# Patient Record
Sex: MALE | Race: WHITE | Employment: OTHER | URBAN - METROPOLITAN AREA
[De-identification: names, ages, dates, MRNs, and addresses within clinical notes are randomized per-mention and may not be internally consistent; named-entity substitution may affect disease eponyms.]

---

## 2019-02-07 ENCOUNTER — HOSPITAL ENCOUNTER (OUTPATIENT)
Age: 59
Setting detail: SPECIMEN
Discharge: HOME | End: 2019-02-07
Payer: MEDICAID

## 2019-02-08 LAB
ABSOLUTE EOS #: 0.26 K/UL (ref 0–0.44)
ABSOLUTE IMMATURE GRANULOCYTE: 0.04 K/UL (ref 0–0.3)
ABSOLUTE LYMPH #: 1.89 K/UL (ref 1.1–3.7)
ABSOLUTE MONO #: 1.01 K/UL (ref 0.1–1.2)
ALBUMIN SERPL-MCNC: 4.1 G/DL (ref 3.5–5.2)
ALBUMIN/GLOBULIN RATIO: 1.3 (ref 1–2.5)
ALP BLD-CCNC: 100 U/L (ref 40–129)
ALT SERPL-CCNC: 27 U/L (ref 5–41)
ANION GAP SERPL CALCULATED.3IONS-SCNC: 12 MMOL/L (ref 9–17)
AST SERPL-CCNC: 20 U/L
BASOPHILS # BLD: 1 % (ref 0–2)
BASOPHILS ABSOLUTE: 0.04 K/UL (ref 0–0.2)
BILIRUB SERPL-MCNC: <0.1 MG/DL (ref 0.3–1.2)
BUN BLDV-MCNC: 9 MG/DL (ref 6–20)
BUN/CREAT BLD: ABNORMAL (ref 9–20)
CALCIUM SERPL-MCNC: 8.9 MG/DL (ref 8.6–10.4)
CHLORIDE BLD-SCNC: 105 MMOL/L (ref 98–107)
CO2: 24 MMOL/L (ref 20–31)
CREAT SERPL-MCNC: 0.87 MG/DL (ref 0.7–1.2)
DIFFERENTIAL TYPE: ABNORMAL
EOSINOPHILS RELATIVE PERCENT: 3 % (ref 1–4)
GFR AFRICAN AMERICAN: >60 ML/MIN
GFR NON-AFRICAN AMERICAN: >60 ML/MIN
GFR SERPL CREATININE-BSD FRML MDRD: ABNORMAL ML/MIN/{1.73_M2}
GFR SERPL CREATININE-BSD FRML MDRD: ABNORMAL ML/MIN/{1.73_M2}
GLUCOSE BLD-MCNC: 95 MG/DL (ref 70–99)
HCT VFR BLD CALC: 46.9 % (ref 40.7–50.3)
HEMOGLOBIN: 15 G/DL (ref 13–17)
IMMATURE GRANULOCYTES: 1 %
LYMPHOCYTES # BLD: 25 % (ref 24–43)
MCH RBC QN AUTO: 30.7 PG (ref 25.2–33.5)
MCHC RBC AUTO-ENTMCNC: 32 G/DL (ref 28.4–34.8)
MCV RBC AUTO: 95.9 FL (ref 82.6–102.9)
MONOCYTES # BLD: 13 % (ref 3–12)
NRBC AUTOMATED: 0 PER 100 WBC
PDW BLD-RTO: 15.1 % (ref 11.8–14.4)
PLATELET # BLD: 270 K/UL (ref 138–453)
PLATELET ESTIMATE: ABNORMAL
PMV BLD AUTO: 10 FL (ref 8.1–13.5)
POTASSIUM SERPL-SCNC: 4.4 MMOL/L (ref 3.7–5.3)
RBC # BLD: 4.89 M/UL (ref 4.21–5.77)
RBC # BLD: ABNORMAL 10*6/UL
SEG NEUTROPHILS: 57 % (ref 36–65)
SEGMENTED NEUTROPHILS ABSOLUTE COUNT: 4.3 K/UL (ref 1.5–8.1)
SODIUM BLD-SCNC: 141 MMOL/L (ref 135–144)
TOTAL PROTEIN: 7.3 G/DL (ref 6.4–8.3)
WBC # BLD: 7.5 K/UL (ref 3.5–11.3)
WBC # BLD: ABNORMAL 10*3/UL

## 2019-08-12 ENCOUNTER — HOSPITAL ENCOUNTER (OUTPATIENT)
Age: 59
Setting detail: SPECIMEN
Discharge: HOME | End: 2019-08-12
Payer: MEDICAID

## 2019-08-12 LAB
HCT VFR BLD CALC: 47.1 % (ref 40.7–50.3)
HEMOGLOBIN: 14.9 G/DL (ref 13–17)
MCH RBC QN AUTO: 31.1 PG (ref 25.2–33.5)
MCHC RBC AUTO-ENTMCNC: 31.6 G/DL (ref 28.4–34.8)
MCV RBC AUTO: 98.3 FL (ref 82.6–102.9)
NRBC AUTOMATED: 0 PER 100 WBC
PDW BLD-RTO: 14.6 % (ref 11.8–14.4)
PLATELET # BLD: 284 K/UL (ref 138–453)
PMV BLD AUTO: 9.6 FL (ref 8.1–13.5)
RBC # BLD: 4.79 M/UL (ref 4.21–5.77)
WBC # BLD: 5.8 K/UL (ref 3.5–11.3)

## 2019-08-13 LAB
ALBUMIN SERPL-MCNC: 3.8 G/DL (ref 3.5–5.2)
ALBUMIN/GLOBULIN RATIO: 1.2 (ref 1–2.5)
ALP BLD-CCNC: 90 U/L (ref 40–129)
ALT SERPL-CCNC: 29 U/L (ref 5–41)
ANION GAP SERPL CALCULATED.3IONS-SCNC: 15 MMOL/L (ref 9–17)
AST SERPL-CCNC: 22 U/L
BILIRUB SERPL-MCNC: 0.19 MG/DL (ref 0.3–1.2)
BUN BLDV-MCNC: 8 MG/DL (ref 6–20)
BUN/CREAT BLD: ABNORMAL (ref 9–20)
CALCIUM SERPL-MCNC: 8.8 MG/DL (ref 8.6–10.4)
CHLORIDE BLD-SCNC: 107 MMOL/L (ref 98–107)
CHOLESTEROL, FASTING: 143 MG/DL
CHOLESTEROL/HDL RATIO: 4.1
CO2: 22 MMOL/L (ref 20–31)
CREAT SERPL-MCNC: 0.93 MG/DL (ref 0.7–1.2)
FERRITIN: 69 UG/L (ref 30–400)
GFR AFRICAN AMERICAN: >60 ML/MIN
GFR NON-AFRICAN AMERICAN: >60 ML/MIN
GFR SERPL CREATININE-BSD FRML MDRD: ABNORMAL ML/MIN/{1.73_M2}
GFR SERPL CREATININE-BSD FRML MDRD: ABNORMAL ML/MIN/{1.73_M2}
GLUCOSE BLD-MCNC: 118 MG/DL (ref 70–99)
HDLC SERPL-MCNC: 35 MG/DL
IRON SATURATION: 19 % (ref 20–55)
IRON: 48 UG/DL (ref 59–158)
LDL CHOLESTEROL: 68 MG/DL (ref 0–130)
POTASSIUM SERPL-SCNC: 4.5 MMOL/L (ref 3.7–5.3)
SODIUM BLD-SCNC: 144 MMOL/L (ref 135–144)
TESTOSTERONE TOTAL: 312 NG/DL (ref 220–1000)
TOTAL IRON BINDING CAPACITY: 257 UG/DL (ref 250–450)
TOTAL PROTEIN: 7.1 G/DL (ref 6.4–8.3)
TRIGLYCERIDE, FASTING: 200 MG/DL
TSH SERPL DL<=0.05 MIU/L-ACNC: 1.03 MIU/L (ref 0.3–5)
UNSATURATED IRON BINDING CAPACITY: 209 UG/DL (ref 112–347)
VITAMIN D 25-HYDROXY: 31.3 NG/ML (ref 30–100)
VLDLC SERPL CALC-MCNC: ABNORMAL MG/DL (ref 1–30)

## 2019-11-11 ENCOUNTER — HOSPITAL ENCOUNTER (OUTPATIENT)
Age: 59
Setting detail: SPECIMEN
Discharge: HOME | End: 2019-11-11
Payer: MEDICAID

## 2019-11-12 LAB
ABSOLUTE EOS #: 0.17 K/UL (ref 0–0.44)
ABSOLUTE IMMATURE GRANULOCYTE: 0.04 K/UL (ref 0–0.3)
ABSOLUTE LYMPH #: 1.9 K/UL (ref 1.1–3.7)
ABSOLUTE MONO #: 1.13 K/UL (ref 0.1–1.2)
ANION GAP SERPL CALCULATED.3IONS-SCNC: 13 MMOL/L (ref 9–17)
BASOPHILS # BLD: 1 % (ref 0–2)
BASOPHILS ABSOLUTE: 0.05 K/UL (ref 0–0.2)
BUN BLDV-MCNC: 13 MG/DL (ref 6–20)
BUN/CREAT BLD: NORMAL (ref 9–20)
CALCIUM SERPL-MCNC: 8.8 MG/DL (ref 8.6–10.4)
CHLORIDE BLD-SCNC: 101 MMOL/L (ref 98–107)
CHOLESTEROL/HDL RATIO: 3.9
CHOLESTEROL: 136 MG/DL
CO2: 21 MMOL/L (ref 20–31)
CREAT SERPL-MCNC: 0.99 MG/DL (ref 0.7–1.2)
DIFFERENTIAL TYPE: ABNORMAL
EOSINOPHILS RELATIVE PERCENT: 2 % (ref 1–4)
GFR AFRICAN AMERICAN: >60 ML/MIN
GFR NON-AFRICAN AMERICAN: >60 ML/MIN
GFR SERPL CREATININE-BSD FRML MDRD: NORMAL ML/MIN/{1.73_M2}
GFR SERPL CREATININE-BSD FRML MDRD: NORMAL ML/MIN/{1.73_M2}
GLUCOSE BLD-MCNC: 95 MG/DL (ref 70–99)
HCT VFR BLD CALC: 45.9 % (ref 40.7–50.3)
HDLC SERPL-MCNC: 35 MG/DL
HEMOGLOBIN: 14.7 G/DL (ref 13–17)
IMMATURE GRANULOCYTES: 0 %
LDL CHOLESTEROL: 70 MG/DL (ref 0–130)
LYMPHOCYTES # BLD: 19 % (ref 24–43)
MCH RBC QN AUTO: 31.2 PG (ref 25.2–33.5)
MCHC RBC AUTO-ENTMCNC: 32 G/DL (ref 28.4–34.8)
MCV RBC AUTO: 97.5 FL (ref 82.6–102.9)
MONOCYTES # BLD: 11 % (ref 3–12)
NRBC AUTOMATED: 0 PER 100 WBC
PDW BLD-RTO: 15.4 % (ref 11.8–14.4)
PLATELET # BLD: 251 K/UL (ref 138–453)
PLATELET ESTIMATE: ABNORMAL
PMV BLD AUTO: 9.6 FL (ref 8.1–13.5)
POTASSIUM SERPL-SCNC: 4.1 MMOL/L (ref 3.7–5.3)
RBC # BLD: 4.71 M/UL (ref 4.21–5.77)
RBC # BLD: ABNORMAL 10*6/UL
SEG NEUTROPHILS: 67 % (ref 36–65)
SEGMENTED NEUTROPHILS ABSOLUTE COUNT: 6.62 K/UL (ref 1.5–8.1)
SODIUM BLD-SCNC: 135 MMOL/L (ref 135–144)
TOTAL CK: 88 U/L (ref 39–308)
TRIGL SERPL-MCNC: 154 MG/DL
VLDLC SERPL CALC-MCNC: ABNORMAL MG/DL (ref 1–30)
WBC # BLD: 9.9 K/UL (ref 3.5–11.3)
WBC # BLD: ABNORMAL 10*3/UL

## 2020-09-15 ENCOUNTER — HOSPITAL ENCOUNTER (OUTPATIENT)
Age: 60
Setting detail: SPECIMEN
Discharge: HOME | End: 2020-09-15
Payer: MEDICAID

## 2020-09-15 LAB
ABSOLUTE EOS #: 0.23 K/UL (ref 0–0.44)
ABSOLUTE IMMATURE GRANULOCYTE: 0.04 K/UL (ref 0–0.3)
ABSOLUTE LYMPH #: 1.99 K/UL (ref 1.1–3.7)
ABSOLUTE MONO #: 0.87 K/UL (ref 0.1–1.2)
ALBUMIN SERPL-MCNC: 4 G/DL (ref 3.5–5.2)
ALBUMIN/GLOBULIN RATIO: 1.3 (ref 1–2.5)
ALP BLD-CCNC: 94 U/L (ref 40–129)
ALT SERPL-CCNC: 31 U/L (ref 5–41)
ANION GAP SERPL CALCULATED.3IONS-SCNC: 10 MMOL/L (ref 9–17)
AST SERPL-CCNC: 25 U/L
BASOPHILS # BLD: 1 % (ref 0–2)
BASOPHILS ABSOLUTE: 0.04 K/UL (ref 0–0.2)
BILIRUB SERPL-MCNC: 0.27 MG/DL (ref 0.3–1.2)
BNP INTERPRETATION: NORMAL
BUN BLDV-MCNC: 9 MG/DL (ref 6–20)
BUN/CREAT BLD: ABNORMAL (ref 9–20)
CALCIUM SERPL-MCNC: 9.6 MG/DL (ref 8.6–10.4)
CHLORIDE BLD-SCNC: 106 MMOL/L (ref 98–107)
CHOLESTEROL, FASTING: 138 MG/DL
CHOLESTEROL/HDL RATIO: 3.7
CO2: 26 MMOL/L (ref 20–31)
CREAT SERPL-MCNC: 1.01 MG/DL (ref 0.7–1.2)
DIFFERENTIAL TYPE: ABNORMAL
EOSINOPHILS RELATIVE PERCENT: 3 % (ref 1–4)
FERRITIN: 230 UG/L (ref 30–400)
GFR AFRICAN AMERICAN: >60 ML/MIN
GFR NON-AFRICAN AMERICAN: >60 ML/MIN
GFR SERPL CREATININE-BSD FRML MDRD: ABNORMAL ML/MIN/{1.73_M2}
GFR SERPL CREATININE-BSD FRML MDRD: ABNORMAL ML/MIN/{1.73_M2}
GLUCOSE BLD-MCNC: 89 MG/DL (ref 70–99)
HCT VFR BLD CALC: 47.9 % (ref 40.7–50.3)
HDLC SERPL-MCNC: 37 MG/DL
HEMOGLOBIN: 15.2 G/DL (ref 13–17)
IMMATURE GRANULOCYTES: 1 %
IRON SATURATION: 33 % (ref 20–55)
IRON: 76 UG/DL (ref 59–158)
LDL CHOLESTEROL: 76 MG/DL (ref 0–130)
LYMPHOCYTES # BLD: 30 % (ref 24–43)
MCH RBC QN AUTO: 30.9 PG (ref 25.2–33.5)
MCHC RBC AUTO-ENTMCNC: 31.7 G/DL (ref 28.4–34.8)
MCV RBC AUTO: 97.4 FL (ref 82.6–102.9)
MONOCYTES # BLD: 13 % (ref 3–12)
NRBC AUTOMATED: 0 PER 100 WBC
PDW BLD-RTO: 14.9 % (ref 11.8–14.4)
PLATELET # BLD: 258 K/UL (ref 138–453)
PLATELET ESTIMATE: ABNORMAL
PMV BLD AUTO: 9.8 FL (ref 8.1–13.5)
POTASSIUM SERPL-SCNC: 4.9 MMOL/L (ref 3.7–5.3)
PRO-BNP: 47 PG/ML
PROSTATE SPECIFIC ANTIGEN: 0.42 UG/L
RBC # BLD: 4.92 M/UL (ref 4.21–5.77)
RBC # BLD: ABNORMAL 10*6/UL
SEG NEUTROPHILS: 52 % (ref 36–65)
SEGMENTED NEUTROPHILS ABSOLUTE COUNT: 3.51 K/UL (ref 1.5–8.1)
SODIUM BLD-SCNC: 142 MMOL/L (ref 135–144)
TOTAL IRON BINDING CAPACITY: 232 UG/DL (ref 250–450)
TOTAL PROTEIN: 7 G/DL (ref 6.4–8.3)
TRIGLYCERIDE, FASTING: 123 MG/DL
TSH SERPL DL<=0.05 MIU/L-ACNC: 1.96 MIU/L (ref 0.3–5)
UNSATURATED IRON BINDING CAPACITY: 156 UG/DL (ref 112–347)
VLDLC SERPL CALC-MCNC: ABNORMAL MG/DL (ref 1–30)
WBC # BLD: 6.7 K/UL (ref 3.5–11.3)
WBC # BLD: ABNORMAL 10*3/UL

## 2020-09-16 LAB
ESTIMATED AVERAGE GLUCOSE: 131 MG/DL
HBA1C MFR BLD: 6.2 % (ref 4–6)

## 2021-02-04 NOTE — ED
Fall HPI





- General


Chief Complaint: Wound/Laceration


Stated Complaint: Fall


Time Seen by Provider: 02/04/21 04:11


Source: patient, EMS, RN notes reviewed, old records reviewed


Mode of arrival: EMS


Limitations: no limitations





- History of Present Illness


Initial Comments: 





This is a 60-year-old male DF for evaluation patient presents today for 

evaluation regards to fall.  Unknown circumstances around fall patient is a poor

story history from EMS patient's chart patient does have laceration to right 

ear.  No known blood thinners, unsure of loss of consciousness


MD Complaint: fall


-: unknown


Fall From: standing


When Fall Occurred: unsure


Fall Witnessed: no


Place Fall Occurred: nursing home/SNF


Loss of Consciousness: unsure


Prolonged Down Time?: no


Symptoms Prior to Fall: none


Location: head


Severity: moderate


Severity scale (1-10): 5


Quality: sharp


Context: tripped/slipped


Associated Symptoms: denies





Review of Systems


ROS Statement: 


Those systems with pertinent positive or pertinent negative responses have been 

documented in the HPI.





ROS Other: All systems not noted in ROS Statement are negative.





Past Medical History


Past Medical History: Unable to Obtain


History of Any Multi-Drug Resistant Organisms: None Reported


Past Surgical History: Unable to Obtain


Past Psychological History: Unable to Obtain


Smoking Status: Current every day smoker


Past Alcohol Use History: Occasional


Past Drug Use History: None Reported





General Exam


Limitations: altered mental status


General appearance: alert, in no apparent distress


Head exam: Present: normocephalic, normal inspection.  Absent: atraumatic 

(Patient did sustain laceration to right ear almost near avulsion, 4 cm)


Eye exam: Present: normal appearance, PERRL, EOMI.  Absent: scleral icterus, 

conjunctival injection, periorbital swelling


ENT exam: Present: normal exam, mucous membranes moist


Neck exam: Present: normal inspection.  Absent: tenderness, meningismus, 

lymphadenopathy


Respiratory exam: Present: normal lung sounds bilaterally.  Absent: respiratory 

distress, wheezes, rales, rhonchi, stridor


Cardiovascular Exam: Present: regular rate, normal rhythm, normal heart sounds. 

Absent: systolic murmur, diastolic murmur, rubs, gallop, clicks


GI/Abdominal exam: Present: soft, normal bowel sounds.  Absent: distended, 

tenderness, guarding, rebound, rigid


Extremities exam: Present: normal inspection, full ROM, normal capillary refill.

 Absent: tenderness, pedal edema, joint swelling, calf tenderness


Back exam: Present: normal inspection


Neurological exam: Present: alert, oriented X3, CN II-XII intact


Psychiatric exam: Present: normal affect, normal mood


Skin exam: Present: warm, dry, intact, normal color.  Absent: rash





Course


                                   Vital Signs











  02/04/21 02/04/21





  04:02 05:25


 


Temperature 98.3 F 


 


Pulse Rate 76 72


 


Respiratory 18 18





Rate  


 


Blood Pressure 144/90 130/72


 


O2 Sat by Pulse 98 98





Oximetry  














- Reevaluation(s)


Reevaluation #1: 





Medical record is reviewed





Laceration is repaired here in the ER bleeding is stopped although tissue of the

ear does look to r to be devitalized





Procedures





- Laceration


  ** Laceration #1


Consent Obtained: verbal consent


Indication: laceration


Site: scalp, other (ear)


Description: linear, flap


Depth: simple, single layer


Anesthetic Used: lidocaine 1%


Type of Sutures: nylon


Size of Sutures: 5-0


Technique: simple, interrupted


Complications: pain


Patient Tolerated Procedure: well





Medical Decision Making





- Medical Decision Making





60 male to the ER status post fall of unknown, CT is negative for traumatic 

injury patient's laceration is repaired





- Radiology Data


Radiology results: report reviewed (CT brain C-spine negative for acute 

disease), image reviewed





Disposition


Clinical Impression: 


 Fall, Laceration of right ear





Disposition: HOME SELF-CARE


Condition: Good


Instructions (If sedation given, give patient instructions):  Laceration (ED), 

Fall Prevention for Older Adults (ED)


Is patient prescribed a controlled substance at d/c from ED?: No


Referrals: 


None,Stated [Primary Care Provider] - 1-2 days

## 2021-02-04 NOTE — CT
EXAM:

  CT Head Without Intravenous Contrast

 

CLINICAL HISTORY:

  ITS.REASON CT Reason: fall

 

TECHNIQUE:

  Axial computed tomography images of the head/brain without intravenous 

contrast.  CTDI is 57.035 mGy and DLP is 1304.2 mGy-cm.  This CT exam was 

performed using one or more of the following dose reduction techniques: 

automated exposure control, adjustment of the mA and/or kV according to 

patient size, and/or use of iterative reconstruction technique.

 

COMPARISON:

  No relevant prior studies available.

 

FINDINGS:

  Brain:  No hemorrhage or mass effect.  Mild cerebral volume loss.

  Ventricles:  No hydrocephalus.

  Bones/joints:  Unremarkable.

  Soft tissues:  Unremarkable.

  Sinuses:  Unremarkable.

  Mastoid air cells:  Clear.

 

IMPRESSION:     

  No acute hemorrhage, hydrocephalus, or mass effect.

 

_______________________________________________

 

EXAM:

  CT Cervical Spine Without Intravenous Contrast

 

CLINICAL HISTORY:

  ITS.REASON CT Reason: fall

 

TECHNIQUE:

  Axial computed tomography images of the cervical spine without 

intravenous contrast.  CTDI is 57.035 mGy and DLP is 1304.2 mGy-cm.  This 

CT exam was performed using one or more of the following dose reduction 

techniques: automated exposure control, adjustment of the mA and/or kV 

according to patient size, and/or use of iterative reconstruction 

technique.

 

COMPARISON:

  No relevant prior studies available.

 

FINDINGS:

  Vertebrae:  No acute fracture.  Prior surgical fixation of the right 

clavicle.

  Discs/spinal canal/neural foramina: Multilevel degenerative disease 

with moderate spinal canal stenosis at C5-6 and C6-7.

  Soft tissues:  No prevertebral swelling.  Mild COPD.

 

IMPRESSION:     

  No acute fracture or subluxation. Multilevel degenerative disease with 

moderate spinal canal stenosis at C5-6 and C6-7.

## 2021-06-21 ENCOUNTER — HOSPITAL ENCOUNTER (OUTPATIENT)
Age: 61
Setting detail: SPECIMEN
Discharge: HOME | End: 2021-06-21
Payer: MEDICAID

## 2021-06-21 LAB
ABSOLUTE EOS #: 0.24 K/UL (ref 0–0.44)
ABSOLUTE IMMATURE GRANULOCYTE: 0.03 K/UL (ref 0–0.3)
ABSOLUTE LYMPH #: 2.35 K/UL (ref 1.1–3.7)
ABSOLUTE MONO #: 1.06 K/UL (ref 0.1–1.2)
ALBUMIN SERPL-MCNC: 4.1 G/DL (ref 3.5–5.2)
ALBUMIN/GLOBULIN RATIO: 1.2 (ref 1–2.5)
ALP BLD-CCNC: 86 U/L (ref 40–129)
ALT SERPL-CCNC: 25 U/L (ref 5–41)
ANION GAP SERPL CALCULATED.3IONS-SCNC: 12 MMOL/L (ref 9–17)
AST SERPL-CCNC: 21 U/L
BASOPHILS # BLD: 1 % (ref 0–2)
BASOPHILS ABSOLUTE: 0.05 K/UL (ref 0–0.2)
BILIRUB SERPL-MCNC: 0.39 MG/DL (ref 0.3–1.2)
BUN BLDV-MCNC: 9 MG/DL (ref 6–20)
BUN/CREAT BLD: NORMAL (ref 9–20)
CALCIUM SERPL-MCNC: 9.4 MG/DL (ref 8.6–10.4)
CHLORIDE BLD-SCNC: 105 MMOL/L (ref 98–107)
CHOLESTEROL, FASTING: 133 MG/DL
CHOLESTEROL/HDL RATIO: 3.6
CO2: 23 MMOL/L (ref 20–31)
CREAT SERPL-MCNC: 1.12 MG/DL (ref 0.7–1.2)
DIFFERENTIAL TYPE: ABNORMAL
EOSINOPHILS RELATIVE PERCENT: 4 % (ref 1–4)
GFR AFRICAN AMERICAN: >60 ML/MIN
GFR NON-AFRICAN AMERICAN: >60 ML/MIN
GFR SERPL CREATININE-BSD FRML MDRD: NORMAL ML/MIN/{1.73_M2}
GFR SERPL CREATININE-BSD FRML MDRD: NORMAL ML/MIN/{1.73_M2}
GLUCOSE BLD-MCNC: 79 MG/DL (ref 70–99)
HCT VFR BLD CALC: 45.2 % (ref 40.7–50.3)
HDLC SERPL-MCNC: 37 MG/DL
HEMOGLOBIN: 14.5 G/DL (ref 13–17)
IMMATURE GRANULOCYTES: 0 %
LDL CHOLESTEROL: 71 MG/DL (ref 0–130)
LYMPHOCYTES # BLD: 35 % (ref 24–43)
MCH RBC QN AUTO: 31 PG (ref 25.2–33.5)
MCHC RBC AUTO-ENTMCNC: 32.1 G/DL (ref 28.4–34.8)
MCV RBC AUTO: 96.6 FL (ref 82.6–102.9)
MONOCYTES # BLD: 16 % (ref 3–12)
NRBC AUTOMATED: 0 PER 100 WBC
PDW BLD-RTO: 16.1 % (ref 11.8–14.4)
PLATELET # BLD: 247 K/UL (ref 138–453)
PLATELET ESTIMATE: ABNORMAL
PMV BLD AUTO: 9.6 FL (ref 8.1–13.5)
POTASSIUM SERPL-SCNC: 4.5 MMOL/L (ref 3.7–5.3)
PROSTATE SPECIFIC ANTIGEN: 0.54 UG/L
RBC # BLD: 4.68 M/UL (ref 4.21–5.77)
RBC # BLD: ABNORMAL 10*6/UL
SEG NEUTROPHILS: 44 % (ref 36–65)
SEGMENTED NEUTROPHILS ABSOLUTE COUNT: 3.09 K/UL (ref 1.5–8.1)
SODIUM BLD-SCNC: 140 MMOL/L (ref 135–144)
TOTAL PROTEIN: 7.4 G/DL (ref 6.4–8.3)
TRIGLYCERIDE, FASTING: 126 MG/DL
TSH SERPL DL<=0.05 MIU/L-ACNC: 1.87 MIU/L (ref 0.3–5)
VLDLC SERPL CALC-MCNC: ABNORMAL MG/DL (ref 1–30)
WBC # BLD: 6.8 K/UL (ref 3.5–11.3)
WBC # BLD: ABNORMAL 10*3/UL

## 2021-12-26 ENCOUNTER — HOSPITAL ENCOUNTER (EMERGENCY)
Dept: HOSPITAL 47 - EC | Age: 61
Discharge: HOME | End: 2021-12-26
Payer: MEDICARE

## 2021-12-26 VITALS — DIASTOLIC BLOOD PRESSURE: 73 MMHG | SYSTOLIC BLOOD PRESSURE: 120 MMHG | HEART RATE: 81 BPM

## 2021-12-26 VITALS — TEMPERATURE: 99.8 F

## 2021-12-26 VITALS — RESPIRATION RATE: 18 BRPM

## 2021-12-26 DIAGNOSIS — F17.200: ICD-10-CM

## 2021-12-26 DIAGNOSIS — U07.1: Primary | ICD-10-CM

## 2021-12-26 LAB
ALBUMIN SERPL-MCNC: 4 G/DL (ref 3.5–5)
ALP SERPL-CCNC: 85 U/L (ref 38–126)
ALT SERPL-CCNC: 34 U/L (ref 4–49)
ANION GAP SERPL CALC-SCNC: 11 MMOL/L
APTT BLD: 24.1 SEC (ref 22–30)
AST SERPL-CCNC: 36 U/L (ref 17–59)
BASOPHILS # BLD AUTO: 0.1 K/UL (ref 0–0.2)
BASOPHILS NFR BLD AUTO: 1 %
BUN SERPL-SCNC: 12 MG/DL (ref 9–20)
CALCIUM SPEC-MCNC: 9.5 MG/DL (ref 8.4–10.2)
CHLORIDE SERPL-SCNC: 100 MMOL/L (ref 98–107)
CO2 SERPL-SCNC: 29 MMOL/L (ref 22–30)
EOSINOPHIL # BLD AUTO: 0.3 K/UL (ref 0–0.7)
EOSINOPHIL NFR BLD AUTO: 5 %
ERYTHROCYTE [DISTWIDTH] IN BLOOD BY AUTOMATED COUNT: 3.98 M/UL (ref 4.3–5.9)
ERYTHROCYTE [DISTWIDTH] IN BLOOD: 13.3 % (ref 11.5–15.5)
GLUCOSE BLD-MCNC: 118 MG/DL (ref 75–99)
GLUCOSE BLD-MCNC: 58 MG/DL (ref 75–99)
GLUCOSE SERPL-MCNC: 67 MG/DL (ref 74–99)
HCT VFR BLD AUTO: 40.7 % (ref 39–53)
HGB BLD-MCNC: 13.9 GM/DL (ref 13–17.5)
INR PPP: 1.1 (ref ?–1.2)
LYMPHOCYTES # SPEC AUTO: 1 K/UL (ref 1–4.8)
LYMPHOCYTES NFR SPEC AUTO: 15 %
MCH RBC QN AUTO: 34.9 PG (ref 25–35)
MCHC RBC AUTO-ENTMCNC: 34.1 G/DL (ref 31–37)
MCV RBC AUTO: 102.4 FL (ref 80–100)
MONOCYTES # BLD AUTO: 0.6 K/UL (ref 0–1)
MONOCYTES NFR BLD AUTO: 10 %
NEUTROPHILS # BLD AUTO: 4.4 K/UL (ref 1.3–7.7)
NEUTROPHILS NFR BLD AUTO: 67 %
PLATELET # BLD AUTO: 143 K/UL (ref 150–450)
POTASSIUM SERPL-SCNC: 4 MMOL/L (ref 3.5–5.1)
PROT SERPL-MCNC: 8.2 G/DL (ref 6.3–8.2)
PT BLD: 11.3 SEC (ref 9–12)
SODIUM SERPL-SCNC: 140 MMOL/L (ref 137–145)
WBC # BLD AUTO: 6.6 K/UL (ref 3.8–10.6)

## 2021-12-26 PROCEDURE — 99285 EMERGENCY DEPT VISIT HI MDM: CPT

## 2021-12-26 PROCEDURE — 83605 ASSAY OF LACTIC ACID: CPT

## 2021-12-26 PROCEDURE — 96360 HYDRATION IV INFUSION INIT: CPT

## 2021-12-26 PROCEDURE — 85025 COMPLETE CBC W/AUTO DIFF WBC: CPT

## 2021-12-26 PROCEDURE — 87040 BLOOD CULTURE FOR BACTERIA: CPT

## 2021-12-26 PROCEDURE — 80053 COMPREHEN METABOLIC PANEL: CPT

## 2021-12-26 PROCEDURE — 85610 PROTHROMBIN TIME: CPT

## 2021-12-26 PROCEDURE — 36415 COLL VENOUS BLD VENIPUNCTURE: CPT

## 2021-12-26 PROCEDURE — 87635 SARS-COV-2 COVID-19 AMP PRB: CPT

## 2021-12-26 PROCEDURE — 71046 X-RAY EXAM CHEST 2 VIEWS: CPT

## 2021-12-26 PROCEDURE — 85730 THROMBOPLASTIN TIME PARTIAL: CPT

## 2021-12-26 PROCEDURE — 93005 ELECTROCARDIOGRAM TRACING: CPT

## 2021-12-26 RX ADMIN — NICARDIPINE HYDROCHLORIDE SCH MLS/HR: 2.5 INJECTION INTRAVENOUS at 13:52

## 2021-12-26 RX ADMIN — NICARDIPINE HYDROCHLORIDE SCH MLS/HR: 2.5 INJECTION INTRAVENOUS at 13:51

## 2021-12-26 NOTE — XR
EXAMINATION TYPE: XR chest 2V

 

DATE OF EXAM: 12/26/2021

 

COMPARISON: None

 

HISTORY: 61-year-old male with fever, increased weakness

 

TECHNIQUE:  AP and lateral views

 

FINDINGS:  

Plate and screw fixation right clavicular shaft. Heart borderline in size. Diffuse interstitial and p
atchy bilateral opacities. No pleural effusion.

 

 

IMPRESSION:  

Diffuse interstitial and patchy bilateral opacities. Consider atypical or COVID pneumonia.
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
## 2022-03-11 ENCOUNTER — HOSPITAL ENCOUNTER (OUTPATIENT)
Dept: HOSPITAL 47 - LABWHC1 | Age: 62
Discharge: HOME | End: 2022-03-11
Attending: INTERNAL MEDICINE
Payer: MEDICARE

## 2022-03-11 DIAGNOSIS — Z86.19: ICD-10-CM

## 2022-03-11 DIAGNOSIS — R94.5: Primary | ICD-10-CM

## 2022-03-11 LAB
ALBUMIN SERPL-MCNC: 4.3 G/DL (ref 3.8–4.9)
ALBUMIN/GLOB SERPL: 1.13 G/DL (ref 1.6–3.17)
ALP SERPL-CCNC: 108 U/L (ref 41–126)
ALT SERPL-CCNC: 180 U/L (ref 10–49)
ANION GAP SERPL CALC-SCNC: 14.4 MMOL/L (ref 10–18)
AST SERPL-CCNC: 106 U/L (ref 14–35)
BASOPHILS # BLD AUTO: 0.02 X 10*3/UL (ref 0–0.1)
BASOPHILS NFR BLD AUTO: 0.4 %
BUN SERPL-SCNC: 14.1 MG/DL (ref 9–27)
BUN/CREAT SERPL: 20.14 RATIO (ref 12–20)
CALCIUM SPEC-MCNC: 9.7 MG/DL (ref 8.7–10.3)
CHLORIDE SERPL-SCNC: 103 MMOL/L (ref 96–109)
CO2 SERPL-SCNC: 24.6 MMOL/L (ref 20–27.5)
EOSINOPHIL # BLD AUTO: 0.15 X 10*3/UL (ref 0.04–0.35)
EOSINOPHIL NFR BLD AUTO: 3.3 %
ERYTHROCYTE [DISTWIDTH] IN BLOOD BY AUTOMATED COUNT: 3.98 X 10*6/UL (ref 4.4–5.6)
ERYTHROCYTE [DISTWIDTH] IN BLOOD: 14.4 % (ref 11.5–14.5)
FERRITIN SERPL-MCNC: 211 NG/ML (ref 22–322)
GLOBULIN SER CALC-MCNC: 3.8 G/DL (ref 1.6–3.3)
GLUCOSE SERPL-MCNC: 119 MG/DL (ref 70–110)
HCT VFR BLD AUTO: 40.9 % (ref 39.6–50)
HGB BLD-MCNC: 13.4 G/DL (ref 13–17)
IMM GRANULOCYTES BLD QL AUTO: 0.2 %
IRON SERPL-MCNC: 88 UG/DL (ref 65–175)
LYMPHOCYTES # SPEC AUTO: 1.11 X 10*3/UL (ref 0.9–5)
LYMPHOCYTES NFR SPEC AUTO: 24.2 %
MCH RBC QN AUTO: 33.7 PG (ref 27–32)
MCHC RBC AUTO-ENTMCNC: 32.8 G/DL (ref 32–37)
MCV RBC AUTO: 102.8 FL (ref 80–97)
MONOCYTES # BLD AUTO: 0.35 X 10*3/UL (ref 0.2–1)
MONOCYTES NFR BLD AUTO: 7.6 %
NEUTROPHILS # BLD AUTO: 2.95 X 10*3/UL (ref 1.8–7.7)
NEUTROPHILS NFR BLD AUTO: 64.3 %
NRBC BLD AUTO-RTO: 0 /100 WBCS (ref 0–0)
PLATELET # BLD AUTO: 112 X 10*3/UL (ref 140–440)
POTASSIUM SERPL-SCNC: 4.1 MMOL/L (ref 3.5–5.5)
PROT SERPL-MCNC: 8.1 G/DL (ref 6.2–8.2)
SODIUM SERPL-SCNC: 142 MMOL/L (ref 135–145)
TIBC SERPL-MCNC: 430 UG/DL (ref 228–460)
WBC # BLD AUTO: 4.59 X 10*3/UL (ref 4.5–10)

## 2022-03-11 PROCEDURE — 86039 ANTINUCLEAR ANTIBODIES (ANA): CPT

## 2022-03-11 PROCEDURE — 87522 HEPATITIS C REVRS TRNSCRPJ: CPT

## 2022-03-11 PROCEDURE — 36415 COLL VENOUS BLD VENIPUNCTURE: CPT

## 2022-03-11 PROCEDURE — 85025 COMPLETE CBC W/AUTO DIFF WBC: CPT

## 2022-03-11 PROCEDURE — 80053 COMPREHEN METABOLIC PANEL: CPT

## 2022-03-11 PROCEDURE — 82728 ASSAY OF FERRITIN: CPT

## 2022-03-11 PROCEDURE — 83550 IRON BINDING TEST: CPT

## 2022-03-11 PROCEDURE — 82103 ALPHA-1-ANTITRYPSIN TOTAL: CPT

## 2022-03-11 PROCEDURE — 83516 IMMUNOASSAY NONANTIBODY: CPT

## 2022-03-11 PROCEDURE — 82390 ASSAY OF CERULOPLASMIN: CPT

## 2022-03-11 PROCEDURE — 83540 ASSAY OF IRON: CPT

## 2022-08-08 ENCOUNTER — HOSPITAL ENCOUNTER (OUTPATIENT)
Age: 62
Setting detail: SPECIMEN
Discharge: HOME | End: 2022-08-08

## 2022-08-09 LAB
ABSOLUTE EOS #: 0.27 K/UL (ref 0–0.44)
ABSOLUTE IMMATURE GRANULOCYTE: 0.04 K/UL (ref 0–0.3)
ABSOLUTE LYMPH #: 2.2 K/UL (ref 1.1–3.7)
ABSOLUTE MONO #: 1.04 K/UL (ref 0.1–1.2)
ALBUMIN SERPL-MCNC: 4.1 G/DL (ref 3.5–5.2)
ALBUMIN/GLOBULIN RATIO: 1.5 (ref 1–2.5)
ALP BLD-CCNC: 77 U/L (ref 40–129)
ALT SERPL-CCNC: 26 U/L (ref 5–41)
ANION GAP SERPL CALCULATED.3IONS-SCNC: 14 MMOL/L (ref 9–17)
AST SERPL-CCNC: 24 U/L
BASOPHILS # BLD: 1 % (ref 0–2)
BASOPHILS ABSOLUTE: 0.06 K/UL (ref 0–0.2)
BILIRUB SERPL-MCNC: 0.31 MG/DL (ref 0.3–1.2)
BUN BLDV-MCNC: 9 MG/DL (ref 8–23)
CALCIUM SERPL-MCNC: 9.3 MG/DL (ref 8.6–10.4)
CHLORIDE BLD-SCNC: 106 MMOL/L (ref 98–107)
CHOLESTEROL, FASTING: 124 MG/DL
CHOLESTEROL/HDL RATIO: 3.5
CO2: 22 MMOL/L (ref 20–31)
CREAT SERPL-MCNC: 1.15 MG/DL (ref 0.7–1.2)
EOSINOPHILS RELATIVE PERCENT: 4 % (ref 1–4)
GFR AFRICAN AMERICAN: >60 ML/MIN
GFR NON-AFRICAN AMERICAN: >60 ML/MIN
GFR SERPL CREATININE-BSD FRML MDRD: NORMAL ML/MIN/{1.73_M2}
GLUCOSE BLD-MCNC: 91 MG/DL (ref 70–99)
HCT VFR BLD CALC: 43.4 % (ref 40.7–50.3)
HDLC SERPL-MCNC: 35 MG/DL
HEMOGLOBIN: 14.4 G/DL (ref 13–17)
IMMATURE GRANULOCYTES: 1 %
LDL CHOLESTEROL: 52 MG/DL (ref 0–130)
LYMPHOCYTES # BLD: 29 % (ref 24–43)
MCH RBC QN AUTO: 33.1 PG (ref 25.2–33.5)
MCHC RBC AUTO-ENTMCNC: 33.2 G/DL (ref 28.4–34.8)
MCV RBC AUTO: 99.8 FL (ref 82.6–102.9)
MONOCYTES # BLD: 14 % (ref 3–12)
NRBC AUTOMATED: 0 PER 100 WBC
PDW BLD-RTO: 16.5 % (ref 11.8–14.4)
PLATELET # BLD: 202 K/UL (ref 138–453)
PMV BLD AUTO: 10.3 FL (ref 8.1–13.5)
POTASSIUM SERPL-SCNC: 4.2 MMOL/L (ref 3.7–5.3)
RBC # BLD: 4.35 M/UL (ref 4.21–5.77)
RBC # BLD: ABNORMAL 10*6/UL
SEG NEUTROPHILS: 51 % (ref 36–65)
SEGMENTED NEUTROPHILS ABSOLUTE COUNT: 4.08 K/UL (ref 1.5–8.1)
SODIUM BLD-SCNC: 142 MMOL/L (ref 135–144)
TOTAL PROTEIN: 6.8 G/DL (ref 6.4–8.3)
TRIGLYCERIDE, FASTING: 186 MG/DL
TSH SERPL DL<=0.05 MIU/L-ACNC: 1.18 UIU/ML (ref 0.3–5)
WBC # BLD: 7.7 K/UL (ref 3.5–11.3)

## 2022-08-29 ENCOUNTER — OFFICE (OUTPATIENT)
Dept: URBAN - METROPOLITAN AREA CLINIC 4 | Facility: CLINIC | Age: 62
End: 2022-08-29
Payer: COMMERCIAL

## 2022-08-29 VITALS
WEIGHT: 261.4 LBS | SYSTOLIC BLOOD PRESSURE: 152 MMHG | DIASTOLIC BLOOD PRESSURE: 92 MMHG | SYSTOLIC BLOOD PRESSURE: 158 MMHG | DIASTOLIC BLOOD PRESSURE: 94 MMHG

## 2022-08-29 DIAGNOSIS — K86.3 PSEUDOCYST OF PANCREAS: ICD-10-CM

## 2022-08-29 DIAGNOSIS — R10.12 LEFT UPPER QUADRANT PAIN: ICD-10-CM

## 2022-08-29 DIAGNOSIS — R10.13 EPIGASTRIC PAIN: ICD-10-CM

## 2022-08-29 DIAGNOSIS — R14.0 ABDOMINAL DISTENSION (GASEOUS): ICD-10-CM

## 2022-08-29 DIAGNOSIS — K85.90 ACUTE PANCREATITIS WITHOUT NECROSIS OR INFECTION, UNSPECIFIE: ICD-10-CM

## 2022-08-29 PROCEDURE — 99204 OFFICE O/P NEW MOD 45 MIN: CPT | Performed by: NURSE PRACTITIONER

## 2022-10-10 ENCOUNTER — HOSPITAL ENCOUNTER (OUTPATIENT)
Dept: HOSPITAL 47 - LABWHC1 | Age: 62
Discharge: HOME | End: 2022-10-10
Attending: INTERNAL MEDICINE
Payer: MEDICARE

## 2022-10-10 DIAGNOSIS — B18.2: Primary | ICD-10-CM

## 2022-10-10 LAB
ALBUMIN SERPL-MCNC: 4.4 G/DL (ref 3.8–4.9)
ALBUMIN/GLOB SERPL: 1.33 G/DL (ref 1.6–3.17)
ALP SERPL-CCNC: 79 U/L (ref 41–126)
ALT SERPL-CCNC: 26 U/L (ref 10–49)
ANION GAP SERPL CALC-SCNC: 8.4 MMOL/L (ref 10–18)
AST SERPL-CCNC: 21 U/L (ref 14–35)
BASOPHILS # BLD AUTO: 0.02 X 10*3/UL (ref 0–0.1)
BASOPHILS NFR BLD AUTO: 0.3 %
BUN SERPL-SCNC: 12.6 MG/DL (ref 9–27)
BUN/CREAT SERPL: 15.54 RATIO (ref 12–20)
CALCIUM SPEC-MCNC: 9.7 MG/DL (ref 8.7–10.3)
CHLORIDE SERPL-SCNC: 101 MMOL/L (ref 96–109)
CO2 SERPL-SCNC: 32.5 MMOL/L (ref 20–27.5)
EOSINOPHIL # BLD AUTO: 0.3 X 10*3/UL (ref 0.04–0.35)
EOSINOPHIL NFR BLD AUTO: 4.6 %
ERYTHROCYTE [DISTWIDTH] IN BLOOD BY AUTOMATED COUNT: 3.8 X 10*6/UL (ref 4.4–5.6)
ERYTHROCYTE [DISTWIDTH] IN BLOOD: 13.3 % (ref 11.5–14.5)
GLOBULIN SER CALC-MCNC: 3.3 G/DL (ref 1.6–3.3)
GLUCOSE SERPL-MCNC: 65 MG/DL (ref 70–110)
HCT VFR BLD AUTO: 39 % (ref 39.6–50)
HGB BLD-MCNC: 13.2 G/DL (ref 13–17)
IMM GRANULOCYTES BLD QL AUTO: 0.3 %
LYMPHOCYTES # SPEC AUTO: 1.76 X 10*3/UL (ref 0.9–5)
LYMPHOCYTES NFR SPEC AUTO: 27 %
MCH RBC QN AUTO: 34.7 PG (ref 27–32)
MCHC RBC AUTO-ENTMCNC: 33.8 G/DL (ref 32–37)
MCV RBC AUTO: 102.6 FL (ref 80–97)
MONOCYTES # BLD AUTO: 0.52 X 10*3/UL (ref 0.2–1)
MONOCYTES NFR BLD AUTO: 8 %
NEUTROPHILS # BLD AUTO: 3.89 X 10*3/UL (ref 1.8–7.7)
NEUTROPHILS NFR BLD AUTO: 59.8 %
NRBC BLD AUTO-RTO: 0 /100 WBCS (ref 0–0)
PLATELET # BLD AUTO: 87 X 10*3/UL (ref 140–440)
POTASSIUM SERPL-SCNC: 4 MMOL/L (ref 3.5–5.5)
PROT SERPL-MCNC: 7.8 G/DL (ref 6.2–8.2)
SODIUM SERPL-SCNC: 142 MMOL/L (ref 135–145)
WBC # BLD AUTO: 6.51 X 10*3/UL (ref 4.5–10)

## 2022-10-10 PROCEDURE — 85025 COMPLETE CBC W/AUTO DIFF WBC: CPT

## 2022-10-10 PROCEDURE — 80053 COMPREHEN METABOLIC PANEL: CPT

## 2022-10-10 PROCEDURE — 36415 COLL VENOUS BLD VENIPUNCTURE: CPT

## 2022-10-10 PROCEDURE — 87522 HEPATITIS C REVRS TRNSCRPJ: CPT

## 2023-03-20 ENCOUNTER — HOSPITAL ENCOUNTER (OUTPATIENT)
Dept: HOSPITAL 47 - LABWHC1 | Age: 63
Discharge: HOME | End: 2023-03-20
Attending: INTERNAL MEDICINE
Payer: MEDICARE

## 2023-03-20 DIAGNOSIS — K74.60: ICD-10-CM

## 2023-03-20 DIAGNOSIS — B18.2: Primary | ICD-10-CM

## 2023-03-20 LAB
AFP-TM SERPL-MCNC: 3.2 NG/ML (ref 0–7.9)
ALBUMIN SERPL-MCNC: 4.7 G/DL (ref 3.8–4.9)
ALBUMIN/GLOB SERPL: 1.47 G/DL (ref 1.6–3.17)
ALP SERPL-CCNC: 79 U/L (ref 41–126)
ALT SERPL-CCNC: 19 U/L (ref 10–49)
ANION GAP SERPL CALC-SCNC: 9.7 MMOL/L (ref 10–18)
AST SERPL-CCNC: 17 U/L (ref 14–35)
BASOPHILS # BLD AUTO: 0.03 X 10*3/UL (ref 0–0.1)
BASOPHILS NFR BLD AUTO: 0.5 %
BUN SERPL-SCNC: 12.6 MG/DL (ref 9–27)
BUN/CREAT SERPL: 15.75 RATIO (ref 12–20)
CALCIUM SPEC-MCNC: 9.8 MG/DL (ref 8.7–10.3)
CHLORIDE SERPL-SCNC: 102 MMOL/L (ref 96–109)
CO2 SERPL-SCNC: 31.3 MMOL/L (ref 20–27.5)
EOSINOPHIL # BLD AUTO: 0.16 X 10*3/UL (ref 0.04–0.35)
EOSINOPHIL NFR BLD AUTO: 2.7 %
ERYTHROCYTE [DISTWIDTH] IN BLOOD BY AUTOMATED COUNT: 3.99 X 10*6/UL (ref 4.4–5.6)
ERYTHROCYTE [DISTWIDTH] IN BLOOD: 13.8 % (ref 11.5–14.5)
GLOBULIN SER CALC-MCNC: 3.2 G/DL (ref 1.6–3.3)
GLUCOSE SERPL-MCNC: 84 MG/DL (ref 70–110)
HCT VFR BLD AUTO: 40.9 % (ref 39.6–50)
HGB BLD-MCNC: 13.6 G/DL (ref 13–17)
IMM GRANULOCYTES BLD QL AUTO: 0.3 %
LYMPHOCYTES # SPEC AUTO: 1.44 X 10*3/UL (ref 0.9–5)
LYMPHOCYTES NFR SPEC AUTO: 24.4 %
MCH RBC QN AUTO: 34.1 PG (ref 27–32)
MCHC RBC AUTO-ENTMCNC: 33.3 G/DL (ref 32–37)
MCV RBC AUTO: 102.5 FL (ref 80–97)
MONOCYTES # BLD AUTO: 0.39 X 10*3/UL (ref 0.2–1)
MONOCYTES NFR BLD AUTO: 6.6 %
NEUTROPHILS # BLD AUTO: 3.87 X 10*3/UL (ref 1.8–7.7)
NEUTROPHILS NFR BLD AUTO: 65.5 %
NRBC BLD AUTO-RTO: 0 /100 WBCS (ref 0–0)
PLATELET # BLD AUTO: 101 X 10*3/UL (ref 140–440)
POTASSIUM SERPL-SCNC: 4.1 MMOL/L (ref 3.5–5.5)
PROT SERPL-MCNC: 7.9 G/DL (ref 6.2–8.2)
SODIUM SERPL-SCNC: 143 MMOL/L (ref 135–145)
WBC # BLD AUTO: 5.91 X 10*3/UL (ref 4.5–10)

## 2023-03-20 PROCEDURE — 36415 COLL VENOUS BLD VENIPUNCTURE: CPT

## 2023-03-20 PROCEDURE — 87522 HEPATITIS C REVRS TRNSCRPJ: CPT

## 2023-03-20 PROCEDURE — 85025 COMPLETE CBC W/AUTO DIFF WBC: CPT

## 2023-03-20 PROCEDURE — 80053 COMPREHEN METABOLIC PANEL: CPT

## 2023-03-20 PROCEDURE — 82105 ALPHA-FETOPROTEIN SERUM: CPT

## 2023-10-05 ENCOUNTER — HOSPITAL ENCOUNTER (OUTPATIENT)
Age: 63
Setting detail: SPECIMEN
Discharge: HOME | End: 2023-10-05

## 2023-10-06 LAB
ALBUMIN SERPL-MCNC: 3.9 G/DL (ref 3.5–5.2)
ALBUMIN/GLOB SERPL: 1.3 {RATIO} (ref 1–2.5)
ALP SERPL-CCNC: 75 U/L (ref 40–129)
ALT SERPL-CCNC: 18 U/L (ref 5–41)
ANION GAP SERPL CALCULATED.3IONS-SCNC: 12 MMOL/L (ref 9–17)
AST SERPL-CCNC: 22 U/L
BASOPHILS # BLD: 0.04 K/UL (ref 0–0.2)
BASOPHILS NFR BLD: 1 % (ref 0–2)
BILIRUB SERPL-MCNC: 0.4 MG/DL (ref 0.3–1.2)
BUN SERPL-MCNC: 16 MG/DL (ref 8–23)
CALCIUM SERPL-MCNC: 9.3 MG/DL (ref 8.6–10.4)
CHLORIDE SERPL-SCNC: 108 MMOL/L (ref 98–107)
CHOLEST SERPL-MCNC: 124 MG/DL
CHOLESTEROL/HDL RATIO: 3.2
CO2 SERPL-SCNC: 23 MMOL/L (ref 20–31)
CREAT SERPL-MCNC: 1.2 MG/DL (ref 0.7–1.2)
EOSINOPHIL # BLD: 0.26 K/UL (ref 0–0.44)
EOSINOPHILS RELATIVE PERCENT: 4 % (ref 1–4)
ERYTHROCYTE [DISTWIDTH] IN BLOOD BY AUTOMATED COUNT: 15.8 % (ref 11.8–14.4)
GFR SERPL CREATININE-BSD FRML MDRD: >60 ML/MIN/1.73M2
GLUCOSE SERPL-MCNC: 121 MG/DL (ref 70–99)
HCT VFR BLD AUTO: 46.2 % (ref 40.7–50.3)
HDLC SERPL-MCNC: 39 MG/DL
HGB BLD-MCNC: 14.4 G/DL (ref 13–17)
IMM GRANULOCYTES # BLD AUTO: 0.06 K/UL (ref 0–0.3)
IMM GRANULOCYTES NFR BLD: 1 %
LDLC SERPL CALC-MCNC: 54 MG/DL (ref 0–130)
LYMPHOCYTES NFR BLD: 1.83 K/UL (ref 1.1–3.7)
LYMPHOCYTES RELATIVE PERCENT: 25 % (ref 24–43)
MCH RBC QN AUTO: 31.8 PG (ref 25.2–33.5)
MCHC RBC AUTO-ENTMCNC: 31.2 G/DL (ref 28.4–34.8)
MCV RBC AUTO: 102 FL (ref 82.6–102.9)
MONOCYTES NFR BLD: 0.87 K/UL (ref 0.1–1.2)
MONOCYTES NFR BLD: 12 % (ref 3–12)
NEUTROPHILS NFR BLD: 57 % (ref 36–65)
NEUTS SEG NFR BLD: 4.25 K/UL (ref 1.5–8.1)
NRBC BLD-RTO: 0 PER 100 WBC
PLATELET # BLD AUTO: 232 K/UL (ref 138–453)
PMV BLD AUTO: 9.9 FL (ref 8.1–13.5)
POTASSIUM SERPL-SCNC: 4.2 MMOL/L (ref 3.7–5.3)
PROT SERPL-MCNC: 7 G/DL (ref 6.4–8.3)
PSA SERPL-MCNC: 0.52 NG/ML
RBC # BLD AUTO: 4.53 M/UL (ref 4.21–5.77)
RBC # BLD: ABNORMAL 10*6/UL
SODIUM SERPL-SCNC: 143 MMOL/L (ref 135–144)
TRIGL SERPL-MCNC: 154 MG/DL
TSH SERPL DL<=0.05 MIU/L-ACNC: 0.88 UIU/ML (ref 0.3–5)
WBC OTHER # BLD: 7.3 K/UL (ref 3.5–11.3)

## 2023-10-23 NOTE — US
EXAMINATION TYPE: US abdomen complete

 

DATE OF EXAM: 7/29/2021

 

COMPARISON: NONE

 

CLINICAL HISTORY: 60-year-old male D69.6 Thrombocytopenia.

 

Sonographer notes: Exam limitations due to body habitus and bowel gas.

 

EXAM MEASUREMENTS:

 

Liver Length:  15.3 cm   

Gallbladder Wall:  .3 cm   

CBD:  .5 cm

Spleen:  13.5 cm   

Right Kidney:  11.3 x 4.3 x 4.1  cm 

Left Kidney:  10.9 x 4.5 x 4.5 cm   

 

 

 

Pancreas:  Only portions of the pancreatic body are seen. The head and tail are obscured by bowel gas
 shadowing.

Liver: Normal size. Relatively homogeneous appearance. No focal lesion.

Gallbladder:  No stones seen

**Evidence for sonographic Palomino's sign:  No

CBD:  wnl 

Spleen: Borderline enlarged.

Kidneys: No hydronephrosis.

Upper IVC:  wnl  

Abd Aorta:  wnl

 

 

 

 

 

IMPRESSION: 

1. Suboptimal visualization of the pancreas.

2. No gallstones or ductal dilatation.

3. Borderline sized spleen at 13.5 cm. Detail Level: Zone Render In Strict Bullet Format?: No Initiate Treatment: Triamcinolone cream - aaa bid x 2 weeks then stop.

## 2024-08-21 ENCOUNTER — HOSPITAL ENCOUNTER (INPATIENT)
Facility: HOSPITAL | Age: 64
LOS: 1 days | Discharge: HOME | DRG: 440 | End: 2024-08-23
Attending: EMERGENCY MEDICINE | Admitting: INTERNAL MEDICINE
Payer: COMMERCIAL

## 2024-08-21 ENCOUNTER — APPOINTMENT (OUTPATIENT)
Dept: CT IMAGING | Facility: HOSPITAL | Age: 64
DRG: 440 | End: 2024-08-21
Payer: COMMERCIAL

## 2024-08-21 DIAGNOSIS — G47.33 OBSTRUCTIVE SLEEP APNEA: ICD-10-CM

## 2024-08-21 DIAGNOSIS — K85.90 ACUTE PANCREATITIS, UNSPECIFIED COMPLICATION STATUS, UNSPECIFIED PANCREATITIS TYPE: Primary | ICD-10-CM

## 2024-08-21 DIAGNOSIS — N17.9 ACUTE KIDNEY INJURY: ICD-10-CM

## 2024-08-21 PROBLEM — E11.9 TYPE 2 DIABETES MELLITUS: Status: ACTIVE | Noted: 2024-08-21

## 2024-08-21 PROBLEM — I10 ESSENTIAL HYPERTENSION: Status: ACTIVE | Noted: 2024-08-21

## 2024-08-21 PROBLEM — Z72.0 TOBACCO USE: Status: ACTIVE | Noted: 2024-08-21

## 2024-08-21 PROBLEM — R74.8 ELEVATED LIVER ENZYMES: Status: ACTIVE | Noted: 2024-08-21

## 2024-08-21 LAB
ALBUMIN SERPL-MCNC: 4 G/DL (ref 3.5–5.2)
ALBUMIN/GLOB SERPL: 1.3 G/DL
ALP SERPL-CCNC: 98 U/L (ref 39–117)
ALT SERPL W P-5'-P-CCNC: 46 U/L (ref 1–41)
ANION GAP SERPL CALCULATED.3IONS-SCNC: 12 MMOL/L (ref 5–15)
AST SERPL-CCNC: 47 U/L (ref 1–40)
BACTERIA UR QL AUTO: ABNORMAL /HPF
BASOPHILS # BLD AUTO: 0.06 10*3/MM3 (ref 0–0.2)
BASOPHILS NFR BLD AUTO: 0.6 % (ref 0–1.5)
BILIRUB SERPL-MCNC: 0.3 MG/DL (ref 0–1.2)
BILIRUB UR QL STRIP: NEGATIVE
BUN SERPL-MCNC: 32 MG/DL (ref 8–23)
BUN/CREAT SERPL: 15.8 (ref 7–25)
CALCIUM SPEC-SCNC: 8.6 MG/DL (ref 8.6–10.5)
CHLORIDE SERPL-SCNC: 104 MMOL/L (ref 98–107)
CHOLEST SERPL-MCNC: 178 MG/DL (ref 0–200)
CLARITY UR: CLEAR
CO2 SERPL-SCNC: 22 MMOL/L (ref 22–29)
COLOR UR: YELLOW
CREAT SERPL-MCNC: 2.02 MG/DL (ref 0.76–1.27)
D-LACTATE SERPL-SCNC: 1.4 MMOL/L (ref 0.5–2)
DEPRECATED RDW RBC AUTO: 45.3 FL (ref 37–54)
EGFRCR SERPLBLD CKD-EPI 2021: 36.6 ML/MIN/1.73
EOSINOPHIL # BLD AUTO: 0.26 10*3/MM3 (ref 0–0.4)
EOSINOPHIL NFR BLD AUTO: 2.5 % (ref 0.3–6.2)
ERYTHROCYTE [DISTWIDTH] IN BLOOD BY AUTOMATED COUNT: 13.3 % (ref 12.3–15.4)
ETHANOL BLD-MCNC: <10 MG/DL (ref 0–10)
GLOBULIN UR ELPH-MCNC: 3.2 GM/DL
GLUCOSE BLDC GLUCOMTR-MCNC: 131 MG/DL (ref 70–130)
GLUCOSE SERPL-MCNC: 193 MG/DL (ref 65–99)
GLUCOSE UR STRIP-MCNC: NEGATIVE MG/DL
HAV IGM SERPL QL IA: NORMAL
HBV CORE IGM SERPL QL IA: NORMAL
HBV SURFACE AG SERPL QL IA: NORMAL
HCT VFR BLD AUTO: 43.1 % (ref 37.5–51)
HCV AB SER QL: NORMAL
HDLC SERPL-MCNC: 22 MG/DL (ref 40–60)
HGB BLD-MCNC: 14.2 G/DL (ref 13–17.7)
HGB UR QL STRIP.AUTO: ABNORMAL
HOLD SPECIMEN: NORMAL
HYALINE CASTS UR QL AUTO: ABNORMAL /LPF
IMM GRANULOCYTES # BLD AUTO: 0.07 10*3/MM3 (ref 0–0.05)
IMM GRANULOCYTES NFR BLD AUTO: 0.7 % (ref 0–0.5)
KETONES UR QL STRIP: NEGATIVE
LDLC SERPL CALC-MCNC: 87 MG/DL (ref 0–100)
LDLC/HDLC SERPL: 3.31 {RATIO}
LEUKOCYTE ESTERASE UR QL STRIP.AUTO: NEGATIVE
LIPASE SERPL-CCNC: 118 U/L (ref 13–60)
LYMPHOCYTES # BLD AUTO: 1.91 10*3/MM3 (ref 0.7–3.1)
LYMPHOCYTES NFR BLD AUTO: 18.4 % (ref 19.6–45.3)
MCH RBC QN AUTO: 30.5 PG (ref 26.6–33)
MCHC RBC AUTO-ENTMCNC: 32.9 G/DL (ref 31.5–35.7)
MCV RBC AUTO: 92.7 FL (ref 79–97)
MONOCYTES # BLD AUTO: 0.57 10*3/MM3 (ref 0.1–0.9)
MONOCYTES NFR BLD AUTO: 5.5 % (ref 5–12)
NEUTROPHILS NFR BLD AUTO: 7.52 10*3/MM3 (ref 1.7–7)
NEUTROPHILS NFR BLD AUTO: 72.3 % (ref 42.7–76)
NITRITE UR QL STRIP: NEGATIVE
NRBC BLD AUTO-RTO: 0 /100 WBC (ref 0–0.2)
NT-PROBNP SERPL-MCNC: 56.7 PG/ML (ref 0–900)
PH UR STRIP.AUTO: 5.5 [PH] (ref 5–8)
PLATELET # BLD AUTO: 202 10*3/MM3 (ref 140–450)
PMV BLD AUTO: 9.8 FL (ref 6–12)
POTASSIUM SERPL-SCNC: 4.9 MMOL/L (ref 3.5–5.2)
PROCALCITONIN SERPL-MCNC: 0.2 NG/ML (ref 0–0.25)
PROT SERPL-MCNC: 7.2 G/DL (ref 6–8.5)
PROT UR QL STRIP: ABNORMAL
RBC # BLD AUTO: 4.65 10*6/MM3 (ref 4.14–5.8)
RBC # UR STRIP: ABNORMAL /HPF
REF LAB TEST METHOD: ABNORMAL
SODIUM SERPL-SCNC: 138 MMOL/L (ref 136–145)
SP GR UR STRIP: 1.02 (ref 1–1.03)
SQUAMOUS #/AREA URNS HPF: ABNORMAL /HPF
TRIGL SERPL-MCNC: 416 MG/DL (ref 0–150)
TRIGL SERPL-MCNC: 416 MG/DL (ref 0–150)
TROPONIN T SERPL HS-MCNC: 23 NG/L
UROBILINOGEN UR QL STRIP: ABNORMAL
VLDLC SERPL-MCNC: 69 MG/DL (ref 5–40)
WBC # UR STRIP: ABNORMAL /HPF
WBC NRBC COR # BLD AUTO: 10.39 10*3/MM3 (ref 3.4–10.8)
WHOLE BLOOD HOLD COAG: NORMAL
WHOLE BLOOD HOLD SPECIMEN: NORMAL

## 2024-08-21 PROCEDURE — 25510000001 IOPAMIDOL 61 % SOLUTION: Performed by: EMERGENCY MEDICINE

## 2024-08-21 PROCEDURE — 83605 ASSAY OF LACTIC ACID: CPT | Performed by: EMERGENCY MEDICINE

## 2024-08-21 PROCEDURE — 93005 ELECTROCARDIOGRAM TRACING: CPT | Performed by: EMERGENCY MEDICINE

## 2024-08-21 PROCEDURE — 63710000001 ONDANSETRON ODT 4 MG TABLET DISPERSIBLE: Performed by: EMERGENCY MEDICINE

## 2024-08-21 PROCEDURE — 99223 1ST HOSP IP/OBS HIGH 75: CPT | Performed by: NURSE PRACTITIONER

## 2024-08-21 PROCEDURE — 25810000003 SODIUM CHLORIDE 0.9 % SOLUTION: Performed by: EMERGENCY MEDICINE

## 2024-08-21 PROCEDURE — 82948 REAGENT STRIP/BLOOD GLUCOSE: CPT

## 2024-08-21 PROCEDURE — 25010000002 PROCHLORPERAZINE 10 MG/2ML SOLUTION: Performed by: EMERGENCY MEDICINE

## 2024-08-21 PROCEDURE — 25010000002 HYDROMORPHONE PER 4 MG: Performed by: EMERGENCY MEDICINE

## 2024-08-21 PROCEDURE — 85025 COMPLETE CBC W/AUTO DIFF WBC: CPT | Performed by: EMERGENCY MEDICINE

## 2024-08-21 PROCEDURE — 80074 ACUTE HEPATITIS PANEL: CPT | Performed by: NURSE PRACTITIONER

## 2024-08-21 PROCEDURE — 81001 URINALYSIS AUTO W/SCOPE: CPT | Performed by: EMERGENCY MEDICINE

## 2024-08-21 PROCEDURE — 84484 ASSAY OF TROPONIN QUANT: CPT | Performed by: EMERGENCY MEDICINE

## 2024-08-21 PROCEDURE — 84145 PROCALCITONIN (PCT): CPT | Performed by: NURSE PRACTITIONER

## 2024-08-21 PROCEDURE — 80061 LIPID PANEL: CPT | Performed by: NURSE PRACTITIONER

## 2024-08-21 PROCEDURE — 82077 ASSAY SPEC XCP UR&BREATH IA: CPT | Performed by: NURSE PRACTITIONER

## 2024-08-21 PROCEDURE — G0378 HOSPITAL OBSERVATION PER HR: HCPCS

## 2024-08-21 PROCEDURE — 74177 CT ABD & PELVIS W/CONTRAST: CPT

## 2024-08-21 PROCEDURE — 99285 EMERGENCY DEPT VISIT HI MDM: CPT

## 2024-08-21 PROCEDURE — 84478 ASSAY OF TRIGLYCERIDES: CPT | Performed by: NURSE PRACTITIONER

## 2024-08-21 PROCEDURE — 25810000003 SODIUM CHLORIDE 0.9 % SOLUTION: Performed by: NURSE PRACTITIONER

## 2024-08-21 PROCEDURE — 83880 ASSAY OF NATRIURETIC PEPTIDE: CPT | Performed by: EMERGENCY MEDICINE

## 2024-08-21 PROCEDURE — 82570 ASSAY OF URINE CREATININE: CPT | Performed by: NURSE PRACTITIONER

## 2024-08-21 PROCEDURE — 80053 COMPREHEN METABOLIC PANEL: CPT | Performed by: EMERGENCY MEDICINE

## 2024-08-21 PROCEDURE — 25810000003 LACTATED RINGERS PER 1000 ML: Performed by: NURSE PRACTITIONER

## 2024-08-21 PROCEDURE — 83690 ASSAY OF LIPASE: CPT | Performed by: EMERGENCY MEDICINE

## 2024-08-21 RX ORDER — PANTOPRAZOLE SODIUM 40 MG/1
40 TABLET, DELAYED RELEASE ORAL DAILY
COMMUNITY

## 2024-08-21 RX ORDER — METOPROLOL TARTRATE 25 MG/1
1 TABLET, FILM COATED ORAL 2 TIMES DAILY
COMMUNITY
Start: 2024-05-30

## 2024-08-21 RX ORDER — BISACODYL 10 MG
10 SUPPOSITORY, RECTAL RECTAL DAILY PRN
Status: DISCONTINUED | OUTPATIENT
Start: 2024-08-21 | End: 2024-08-23 | Stop reason: HOSPADM

## 2024-08-21 RX ORDER — ALLOPURINOL 100 MG/1
1 TABLET ORAL DAILY
COMMUNITY
Start: 2024-07-25

## 2024-08-21 RX ORDER — HYDROMORPHONE HYDROCHLORIDE 1 MG/ML
0.5 INJECTION, SOLUTION INTRAMUSCULAR; INTRAVENOUS; SUBCUTANEOUS
Status: DISCONTINUED | OUTPATIENT
Start: 2024-08-21 | End: 2024-08-23 | Stop reason: HOSPADM

## 2024-08-21 RX ORDER — METOPROLOL TARTRATE 25 MG/1
25 TABLET, FILM COATED ORAL EVERY 12 HOURS SCHEDULED
Status: DISCONTINUED | OUTPATIENT
Start: 2024-08-21 | End: 2024-08-23 | Stop reason: HOSPADM

## 2024-08-21 RX ORDER — SODIUM CHLORIDE, SODIUM LACTATE, POTASSIUM CHLORIDE, CALCIUM CHLORIDE 600; 310; 30; 20 MG/100ML; MG/100ML; MG/100ML; MG/100ML
100 INJECTION, SOLUTION INTRAVENOUS CONTINUOUS
Status: ACTIVE | OUTPATIENT
Start: 2024-08-21 | End: 2024-08-22

## 2024-08-21 RX ORDER — OXYCODONE AND ACETAMINOPHEN 7.5; 325 MG/1; MG/1
1 TABLET ORAL ONCE
Status: COMPLETED | OUTPATIENT
Start: 2024-08-21 | End: 2024-08-21

## 2024-08-21 RX ORDER — AMLODIPINE BESYLATE 10 MG/1
10 TABLET ORAL
Status: DISCONTINUED | OUTPATIENT
Start: 2024-08-22 | End: 2024-08-23 | Stop reason: HOSPADM

## 2024-08-21 RX ORDER — PROCHLORPERAZINE EDISYLATE 5 MG/ML
10 INJECTION INTRAMUSCULAR; INTRAVENOUS ONCE
Status: COMPLETED | OUTPATIENT
Start: 2024-08-21 | End: 2024-08-21

## 2024-08-21 RX ORDER — IOPAMIDOL 612 MG/ML
100 INJECTION, SOLUTION INTRAVASCULAR
Status: COMPLETED | OUTPATIENT
Start: 2024-08-21 | End: 2024-08-21

## 2024-08-21 RX ORDER — LISINOPRIL 40 MG/1
1 TABLET ORAL DAILY
COMMUNITY
Start: 2024-04-16 | End: 2024-08-23 | Stop reason: HOSPADM

## 2024-08-21 RX ORDER — NITROGLYCERIN 0.4 MG/1
0.4 TABLET SUBLINGUAL
Status: DISCONTINUED | OUTPATIENT
Start: 2024-08-21 | End: 2024-08-23 | Stop reason: HOSPADM

## 2024-08-21 RX ORDER — DEXTROSE MONOHYDRATE 25 G/50ML
25 INJECTION, SOLUTION INTRAVENOUS
Status: DISCONTINUED | OUTPATIENT
Start: 2024-08-21 | End: 2024-08-22

## 2024-08-21 RX ORDER — ONDANSETRON 4 MG/1
4 TABLET, ORALLY DISINTEGRATING ORAL ONCE
Status: COMPLETED | OUTPATIENT
Start: 2024-08-21 | End: 2024-08-21

## 2024-08-21 RX ORDER — EZETIMIBE 10 MG/1
10 TABLET ORAL DAILY
COMMUNITY
Start: 2024-04-04

## 2024-08-21 RX ORDER — ONDANSETRON 4 MG/1
4 TABLET, ORALLY DISINTEGRATING ORAL EVERY 6 HOURS PRN
Status: DISCONTINUED | OUTPATIENT
Start: 2024-08-21 | End: 2024-08-23 | Stop reason: HOSPADM

## 2024-08-21 RX ORDER — NICOTINE 21 MG/24HR
1 PATCH, TRANSDERMAL 24 HOURS TRANSDERMAL
Status: DISCONTINUED | OUTPATIENT
Start: 2024-08-22 | End: 2024-08-23 | Stop reason: HOSPADM

## 2024-08-21 RX ORDER — AMLODIPINE BESYLATE 10 MG/1
1 TABLET ORAL DAILY
COMMUNITY
Start: 2024-03-23

## 2024-08-21 RX ORDER — COLCHICINE 0.6 MG/1
0.6 TABLET ORAL DAILY
Status: ON HOLD | COMMUNITY
End: 2024-08-23

## 2024-08-21 RX ORDER — IBUPROFEN 600 MG/1
1 TABLET ORAL
Status: DISCONTINUED | OUTPATIENT
Start: 2024-08-21 | End: 2024-08-22

## 2024-08-21 RX ORDER — SODIUM CHLORIDE 9 MG/ML
10 INJECTION, SOLUTION INTRAMUSCULAR; INTRAVENOUS; SUBCUTANEOUS AS NEEDED
Status: DISCONTINUED | OUTPATIENT
Start: 2024-08-21 | End: 2024-08-23 | Stop reason: HOSPADM

## 2024-08-21 RX ORDER — NICOTINE POLACRILEX 4 MG
15 LOZENGE BUCCAL
Status: DISCONTINUED | OUTPATIENT
Start: 2024-08-21 | End: 2024-08-22

## 2024-08-21 RX ORDER — HYDROMORPHONE HYDROCHLORIDE 1 MG/ML
0.5 INJECTION, SOLUTION INTRAMUSCULAR; INTRAVENOUS; SUBCUTANEOUS ONCE
Status: COMPLETED | OUTPATIENT
Start: 2024-08-21 | End: 2024-08-21

## 2024-08-21 RX ORDER — CYCLOBENZAPRINE HCL 10 MG
10 TABLET ORAL 3 TIMES DAILY PRN
COMMUNITY
Start: 2024-06-28

## 2024-08-21 RX ORDER — HYDROCODONE BITARTRATE AND ACETAMINOPHEN 5; 325 MG/1; MG/1
1 TABLET ORAL EVERY 6 HOURS PRN
COMMUNITY

## 2024-08-21 RX ORDER — AMOXICILLIN 250 MG
2 CAPSULE ORAL 2 TIMES DAILY
Status: DISCONTINUED | OUTPATIENT
Start: 2024-08-21 | End: 2024-08-23 | Stop reason: HOSPADM

## 2024-08-21 RX ORDER — ONDANSETRON 4 MG/1
4 TABLET, ORALLY DISINTEGRATING ORAL
COMMUNITY
Start: 2024-07-23

## 2024-08-21 RX ORDER — POLYETHYLENE GLYCOL 3350 17 G/17G
17 POWDER, FOR SOLUTION ORAL DAILY PRN
Status: DISCONTINUED | OUTPATIENT
Start: 2024-08-21 | End: 2024-08-23 | Stop reason: HOSPADM

## 2024-08-21 RX ORDER — ONDANSETRON 2 MG/ML
4 INJECTION INTRAMUSCULAR; INTRAVENOUS EVERY 6 HOURS PRN
Status: DISCONTINUED | OUTPATIENT
Start: 2024-08-21 | End: 2024-08-23 | Stop reason: HOSPADM

## 2024-08-21 RX ORDER — BISACODYL 5 MG/1
5 TABLET, DELAYED RELEASE ORAL DAILY PRN
Status: DISCONTINUED | OUTPATIENT
Start: 2024-08-21 | End: 2024-08-23 | Stop reason: HOSPADM

## 2024-08-21 RX ADMIN — SODIUM CHLORIDE 1000 ML: 9 INJECTION, SOLUTION INTRAVENOUS at 16:16

## 2024-08-21 RX ADMIN — SODIUM CHLORIDE, POTASSIUM CHLORIDE, SODIUM LACTATE AND CALCIUM CHLORIDE 100 ML/HR: 600; 310; 30; 20 INJECTION, SOLUTION INTRAVENOUS at 22:48

## 2024-08-21 RX ADMIN — SODIUM CHLORIDE 1000 ML: 9 INJECTION, SOLUTION INTRAVENOUS at 19:10

## 2024-08-21 RX ADMIN — IOPAMIDOL 85 ML: 612 INJECTION, SOLUTION INTRAVENOUS at 17:10

## 2024-08-21 RX ADMIN — SENNOSIDES AND DOCUSATE SODIUM 2 TABLET: 50; 8.6 TABLET ORAL at 22:45

## 2024-08-21 RX ADMIN — OXYCODONE HYDROCHLORIDE AND ACETAMINOPHEN 1 TABLET: 7.5; 325 TABLET ORAL at 16:16

## 2024-08-21 RX ADMIN — PROCHLORPERAZINE EDISYLATE 10 MG: 5 INJECTION INTRAMUSCULAR; INTRAVENOUS at 19:10

## 2024-08-21 RX ADMIN — METOPROLOL TARTRATE 25 MG: 25 TABLET, FILM COATED ORAL at 22:45

## 2024-08-21 RX ADMIN — HYDROMORPHONE HYDROCHLORIDE 0.5 MG: 1 INJECTION, SOLUTION INTRAMUSCULAR; INTRAVENOUS; SUBCUTANEOUS at 19:10

## 2024-08-21 RX ADMIN — ONDANSETRON 4 MG: 4 TABLET, ORALLY DISINTEGRATING ORAL at 16:16

## 2024-08-21 NOTE — ED PROVIDER NOTES
Subjective   History of Present Illness  Pleasant patient presents the ER for upper abdominal pain.  Is had nausea vomiting.  History of pancreatitis this is similar.  Patient tells me he typically goes to .  This is going on for several weeks getting worse.  Denies any fevers or chills.  Patient was seen at  earlier this month and they were going to admit him to the hospital however they did not have any rooms and the patient did not want to stay in a hallway bed so he went AMA.        Review of Systems    Past Medical History:   Diagnosis Date    Diabetes mellitus     Hypertension        Allergies   Allergen Reactions    Statins Nausea And Vomiting    Tagamet [Cimetidine] Unknown - High Severity       No past surgical history on file.    Family History   Problem Relation Age of Onset    Hypertension Father        Social History     Socioeconomic History    Marital status:    Tobacco Use    Smoking status: Every Day     Types: Cigarettes   Substance and Sexual Activity    Alcohol use: Never    Drug use: Never    Sexual activity: Defer           Objective   Physical Exam  Constitutional:       Appearance: He is well-developed.   HENT:      Head: Normocephalic and atraumatic.      Right Ear: External ear normal.      Left Ear: External ear normal.      Nose: Nose normal.   Eyes:      Conjunctiva/sclera: Conjunctivae normal.      Pupils: Pupils are equal, round, and reactive to light.   Cardiovascular:      Rate and Rhythm: Normal rate and regular rhythm.      Heart sounds: Normal heart sounds.   Pulmonary:      Effort: Pulmonary effort is normal.      Breath sounds: Normal breath sounds.   Abdominal:      General: Bowel sounds are normal.      Palpations: Abdomen is soft.      Tenderness:  in the epigastric area and left upper quadrant   Musculoskeletal:         General: Normal range of motion.      Cervical back: Normal range of motion and neck supple.   Skin:     General: Skin is warm and dry.    Neurological:      Mental Status: He is alert and oriented to person, place, and time.   Psychiatric:         Behavior: Behavior normal.         Judgment: Judgment normal.         Procedures           ED Course  ED Course as of 08/21/24 1959   Wed Aug 21, 2024   1622 Awaiting CAT scan.  Differential includes pancreatitis.  Bowel obstruction.  Dehydration.  Abscess. []   1810 Pancreatitis on the CAT scan.  Elevated lipase and elevated renal function.  Will admit to the hospitalist for further evaluation. [JM]   1816 CBC & Differential(!) [JM]   1816 Lipase(!) [JM]   1816 Comprehensive Metabolic Panel(!) [JM]   1816 ECG 12 Lead Chest Pain  Labs and EKG interpreted by myself.  Acute kidney injury.  EKG no acute process. []      ED Course User Index  [] Dionte Artis, RAYMON                                 CT Abdomen Pelvis With Contrast   Final Result   Impression:   1.There is a small fluid collection measuring 3.4 x 1.8 cm insinuating about the pancreatic tail. Mild surrounding stranding and additional mild fluid present. Findings likely represent sequela of subacute pancreatitis with developing small pseudocyst.    CT follow-up is recommended. Pancreatic protocol MRI may also be considered for further evaluation.   2.Hepatic steatosis.   3.Additional findings as detailed above.      Electronically Signed: Cody Ma MD     8/21/2024 5:26 PM EDT     Workstation ID: [id number]                    Medical Decision Making  Problems Addressed:  Acute kidney injury: complicated acute illness or injury  Acute pancreatitis, unspecified complication status, unspecified pancreatitis type: complicated acute illness or injury    Amount and/or Complexity of Data Reviewed  External Data Reviewed: labs and radiology.  Labs: ordered. Decision-making details documented in ED Course.  Radiology: ordered. Decision-making details documented in ED Course.  ECG/medicine tests: ordered. Decision-making details documented in ED  Course.    Risk  Prescription drug management.  Decision regarding hospitalization.        Final diagnoses:   Acute kidney injury   Acute pancreatitis, unspecified complication status, unspecified pancreatitis type       ED Disposition  ED Disposition       ED Disposition   Decision to Admit    Condition   --    Comment   Level of Care: Telemetry [5]   Diagnosis: Acute pancreatitis [577.0.ICD-9-CM]                 No follow-up provider specified.       Medication List      No changes were made to your prescriptions during this visit.            Dionte Artis APRN  08/21/24 1818       Dionte Artis APRN  08/21/24 1819       Dionte Artis APRN  08/21/24 1959

## 2024-08-21 NOTE — H&P
[facility] Medicine Services  HISTORY AND PHYSICAL    Patient Name: Dionte Dent  : 1960  MRN: [id number]  Primary Care Physician: Orin Vera APRN  Date of admission: 2024    Subjective   Subjective     Chief Complaint:  Nausea, vomiting, upper abdominal pain    HPI:  Dionte Dent is a 63 y.o. male past medical history significant for diabetes mellitus type 2, essential hypertension, pancreatitis and tobacco use presents to the ED with complaints of worsening nausea, vomiting and upper abdominal pain over the past month.  Patient initially presented to [facility] on 2024 for the above symptoms and CT of abdomen and pelvis without contrast revealed acute pancreatitis with 3 x 3.5 cm pseudocyst.  Due to having no available rooms patient declined to stay in hallway bed and left AMA.  Patient states his symptoms have not improved.  He denies any fever, chills, cough, shortness of breath, diarrhea, dysuria or chest pain.  CT of abdomen and pelvis with contrast tonight notes small fluid collection measuring 3.4 x 1.8 cm insinuating about the pancreatic tail.  Mild surrounding stranding and additional mild fluid present.  Likely represents sequela of subacute pancreatitis with developing small pseudocyst.  Patient tells me his last flareup with pancreatitis was about a year and a half ago.  He additionally states in the past they have not found underlying cause of his pancreatitis flares.        Review of Systems   Constitutional:  Positive for appetite change. Negative for activity change, chills, diaphoresis, fatigue, fever and unexpected weight change.   HENT: Negative.     Eyes:  Negative for photophobia and visual disturbance.   Respiratory:  Negative for shortness of breath.    Cardiovascular:  Negative for chest pain, palpitations and leg swelling.   Gastrointestinal:  Positive for abdominal pain, nausea and vomiting. Negative for abdominal distention, blood in stool,  constipation and diarrhea.   Genitourinary:  Negative for difficulty urinating, dysuria, frequency and hematuria.   Musculoskeletal:  Negative for back pain, neck pain and neck stiffness.   Skin: Negative.    Neurological:  Negative for dizziness, speech difficulty, weakness, light-headedness, numbness and headaches.   Psychiatric/Behavioral: Negative.                  Personal History     Past Medical History:   Diagnosis Date    Diabetes mellitus     Hypertension              No past surgical history on file.    Family History:  family history includes Hypertension in his father.     Social History:  reports that he has been smoking cigarettes. He does not have any smokeless tobacco history on file. He reports that he does not drink alcohol and does not use drugs.  Social History     Social History Narrative    Not on file       Medications:       Allergies   Allergen Reactions    Statins Nausea And Vomiting    Tagamet [Cimetidine] Unknown - High Severity       Objective   Objective     Vital Signs:   Temp:  [98.2 °F (36.8 °C)] 98.2 °F (36.8 °C)  Heart Rate:  [66-92] 66  Resp:  [18] 18  BP: (120-159)/(76-93) 127/76    Physical Exam  Vitals and nursing note reviewed.   Constitutional:       General: He is not in acute distress.     Appearance: Normal appearance. He is not ill-appearing, toxic-appearing or diaphoretic.   HENT:      Head: Normocephalic and atraumatic.      Nose: Nose normal.      Mouth/Throat:      Mouth: Mucous membranes are dry.      Pharynx: Oropharynx is clear.   Eyes:      Extraocular Movements: Extraocular movements intact.      Pupils: Pupils are equal, round, and reactive to light.   Cardiovascular:      Rate and Rhythm: Normal rate and regular rhythm.      Pulses: Normal pulses.      Heart sounds: Normal heart sounds.   Pulmonary:      Effort: Pulmonary effort is normal.      Breath sounds: Normal breath sounds.   Abdominal:      General: Bowel sounds are normal. There is no distension.       Palpations: There is no mass.      Tenderness: There is abdominal tenderness. There is no right CVA tenderness, left CVA tenderness, guarding or rebound.      Hernia: No hernia is present.      Comments: Epigastric and left upper quadrant tenderness with exam   Musculoskeletal:         General: No swelling, tenderness, deformity or signs of injury. Normal range of motion.      Cervical back: Normal range of motion and neck supple.      Right lower leg: No edema.      Left lower leg: No edema.   Skin:     General: Skin is warm and dry.   Neurological:      General: No focal deficit present.      Mental Status: He is alert and oriented to person, place, and time. Mental status is at baseline.   Psychiatric:         Mood and Affect: Mood normal.         Behavior: Behavior normal.         Thought Content: Thought content normal.         Judgment: Judgment normal.            Result Review:  I have personally reviewed the results from the time of this admission to 8/21/2024 19:34 EDT and agree with these findings:  [x]  Laboratory list / accordion  [x]  Microbiology  [x]  Radiology  [x]  EKG/Telemetry   []  Cardiology/Vascular   []  Pathology  []  Old records  []  Other:  Most notable findings include:     LAB RESULTS:      Lab 08/21/24  1551   WBC 10.39   HEMOGLOBIN 14.2   HEMATOCRIT 43.1   PLATELETS 202   NEUTROS ABS 7.52*   IMMATURE GRANS (ABS) 0.07*   LYMPHS ABS 1.91   MONOS ABS 0.57   EOS ABS 0.26   MCV 92.7   LACTATE 1.4         Lab 08/21/24  1551   SODIUM 138   POTASSIUM 4.9   CHLORIDE 104   CO2 22.0   ANION GAP 12.0   BUN 32*   CREATININE 2.02*   EGFR 36.6*   GLUCOSE 193*   CALCIUM 8.6         Lab 08/21/24  1551   TOTAL PROTEIN 7.2   ALBUMIN 4.0   GLOBULIN 3.2   ALT (SGPT) 46*   AST (SGOT) 47*   BILIRUBIN 0.3   ALK PHOS 98   LIPASE 118*         Lab 08/21/24  1551   PROBNP 56.7   HSTROP T 23*                 Brief Urine Lab Results  (Last result in the past 365 days)        Color   Clarity   Blood   Leuk Est    Nitrite   Protein   CREAT   Urine HCG        08/21/24 1543 Yellow   Clear   Moderate (2+)   Negative   Negative   100 mg/dL (2+)                 Microbiology Results (last 10 days)       ** No results found for the last 240 hours. **            CT Abdomen Pelvis With Contrast    Result Date: 8/21/2024  CT ABDOMEN PELVIS W CONTRAST Date of Exam: 8/21/2024 5:02 PM EDT Indication: upper abd pain. hx of pancreatitis.. Comparison: None available. Technique: Axial CT images were obtained of the abdomen and pelvis following the uneventful intravenous administration of 85 mL Isovue-300. Reconstructed coronal and sagittal images were also obtained. Automated exposure control and iterative construction methods were used. Findings: Liver: The liver is prominent in size and decreased in attenuation. No focal liver lesion is seen. No biliary dilation is seen. Gallbladder: Unremarkable. Pancreas: There is a small fluid collection measuring 3.4 x 1.8 cm insinuating about the pancreatic tail. Mild surrounding stranding and additional mild fluid present. Findings likely represent sequela of subacute pancreatitis with developing small pseudocyst. CT follow-up is recommended. Pancreatic protocol MRI may also be considered for further evaluation. Spleen: Unremarkable. Adrenal glands: Unremarkable. Genitourinary tract: Small bilateral renal cysts and subcentimeter hypodensities which are too small to characterize. Kidneys are otherwise unremarkable. The ureters and urinary bladder appear unremarkable. Prostate gland is unremarkable. Gastrointestinal tract: The visualized hollow viscera demonstrate no focal lesion. There is no evidence of bowel obstruction. Appendix: No findings to suggest acute appendicitis. Other findings: No free air is identified. No pathologically enlarged lymph nodes are seen. Vascular calcifications are present. The IVC is unremarkable. Bones and soft tissues: No acute osseous lesion is identified. There are  degenerative changes within the spine. There are bilateral L4 pars defects with 15 mm anterolisthesis at L4-L5. Minimal retrolisthesis at L5-S1. Superficial soft tissues demonstrate  no acute abnormality. Lung bases: Scattered bibasilar atelectasis, edema, or mild atypical infiltrates. Calcified granuloma within the right lung base.     Impression: Impression: 1.There is a small fluid collection measuring 3.4 x 1.8 cm insinuating about the pancreatic tail. Mild surrounding stranding and additional mild fluid present. Findings likely represent sequela of subacute pancreatitis with developing small pseudocyst. CT follow-up is recommended. Pancreatic protocol MRI may also be considered for further evaluation. 2.Hepatic steatosis. 3.Additional findings as detailed above. Electronically Signed: Cody Ma MD  8/21/2024 5:26 PM EDT  Workstation ID: [id number]         Assessment & Plan   Assessment & Plan       Type 2 diabetes mellitus    Essential hypertension    Elevated liver enzymes    Tobacco use    Acute pancreatitis    DOUGLAS (acute kidney injury)      63-year-old male presents the ED with worsening abdominal pain, nausea and vomiting over the past 4 weeks.    1) acute pancreatitis  - CT abdomen pelvis with contrast as mentioned above  - N.p.o.  - IV fluids  - Pain control  - As needed antiemetics  - Check FLP  - Ethanol pending  - Pro-Fredrick pending    2) DOUGLAS  - Likely secondary to volume depletion  - Continue IV fluids  - Check urine studies  - Consider nephrology consult if no improvement  -Strict I's and O's    3) elevated LFTs, mild  -Check hepatitis panel  - CT abdomen pelvis noted hepatic steatosis  -Repeat labs in a.m.    4) diabetes mellitus type 2  - Check hemoglobin A1c  - Fingersticks before meals and at bedtime  - Start sliding scale insulin    5) essential hypertension  - Continue amlodipine, metoprolol  - Hold lisinopril due to #2    6) tobacco use  - Cessation education provided  - Nicotine  patch      DVT prophylaxis:  scds    CODE STATUS:  Full Code        Expected Discharge  TBD     This note has been completed as part of a split-shared workflow.     Signature: Electronically signed by RAYMON Montero, 08/21/24, 7:46 PM EDT.

## 2024-08-21 NOTE — ED NOTES
Dionte Dent    Nursing Report ED to Floor:  Mental status: AO4  Ambulatory status: X1  Oxygen Therapy:  RA  Cardiac Rhythm: NSR  Admitted from: ED  Safety Concerns:  FALL RISK  Social Issues: NONE  ED Room #:  5    ED Nurse Phone Extension - [phone] or may call [phone].      HPI:   Chief Complaint   Patient presents with    Abdominal Pain       Past Medical History:  No past medical history on file.     Past Surgical History:  No past surgical history on file.     Admitting Doctor:   Woo Houston MD    Consulting Provider(s):  Consults       No orders found from 7/23/2024 to 8/22/2024.             Admitting Diagnosis:   The primary encounter diagnosis was Acute pancreatitis, unspecified complication status, unspecified pancreatitis type. A diagnosis of Acute kidney injury was also pertinent to this visit.    Most Recent Vitals:   Vitals:    08/21/24 1829 08/21/24 1830 08/21/24 1847 08/21/24 1848   BP:  127/76     Patient Position:       Pulse: 73 73 66 66   Resp:       Temp:       TempSrc:       SpO2: 95% 93% 96% 94%   Weight:       Height:           Active LDAs/IV Access:   Lines, Drains & Airways       Active LDAs       Name Placement date Placement time Site Days    Peripheral IV 08/21/24 1552 Left Antecubital 08/21/24  1552  Antecubital  less than 1                    Labs (abnormal labs have a star):   Labs Reviewed   COMPREHENSIVE METABOLIC PANEL - Abnormal; Notable for the following components:       Result Value    Glucose 193 (*)     BUN 32 (*)     Creatinine 2.02 (*)     ALT (SGPT) 46 (*)     AST (SGOT) 47 (*)     eGFR 36.6 (*)     All other components within normal limits    Narrative:     GFR Normal >60  Chronic Kidney Disease <60  Kidney Failure <15     LIPASE - Abnormal; Notable for the following components:    Lipase 118 (*)     All other components within normal limits   URINALYSIS W/ MICROSCOPIC IF INDICATED (NO CULTURE) - Abnormal; Notable for the following components:    Blood, UA Moderate (2+) (*)      Protein,  mg/dL (2+) (*)     All other components within normal limits   CBC WITH AUTO DIFFERENTIAL - Abnormal; Notable for the following components:    Lymphocyte % 18.4 (*)     Immature Grans % 0.7 (*)     Neutrophils, Absolute 7.52 (*)     Immature Grans, Absolute 0.07 (*)     All other components within normal limits   SINGLE HS TROPONIN T - Abnormal; Notable for the following components:    HS Troponin T 23 (*)     All other components within normal limits    Narrative:     High Sensitive Troponin T Reference Range:  <14.0 ng/L- Negative Female for AMI  <22.0 ng/L- Negative Male for AMI  >=14 - Abnormal Female indicating possible myocardial injury.  >=22 - Abnormal Male indicating possible myocardial injury.   Clinicians would have to utilize clinical acumen, EKG, Troponin, and serial changes to determine if it is an Acute Myocardial Infarction or myocardial injury due to an underlying chronic condition.        URINALYSIS, MICROSCOPIC ONLY - Abnormal; Notable for the following components:    RBC, UA 6-10 (*)     All other components within normal limits   LACTIC ACID, PLASMA - Normal   BNP (IN-HOUSE) - Normal    Narrative:     This assay is used as an aid in the diagnosis of individuals suspected of having heart failure. It can be used as an aid in the diagnosis of acute decompensated heart failure (ADHF) in patients presenting with signs and symptoms of ADHF to the emergency department (ED). In addition, NT-proBNP of <300 pg/mL indicates ADHF is not likely.    Age Range Result Interpretation  NT-proBNP Concentration (pg/mL:      <50             Positive            >450                   Gray                 300-450                    Negative             <300    50-75           Positive            >900                  Gray                300-900                  Negative            <300      >75             Positive            >1800                  Gray                300-1800                   Negative            <300   RAINBOW DRAW    Narrative:     The following orders were created for panel order [facility] Draw.  Procedure                               Abnormality         Status                     ---------                               -----------         ------                     Green Top (Gel)[[id number]]                                  Final result               Lavender Top[[id number]]                                     Final result               Gold Top - SST[[id number]]                                   Final result               Gray Top[[id number]]                                         Final result               Light Blue Top[[id number]]                                   Final result                 Please view results for these tests on the individual orders.   LIPID PANEL   TRIGLYCERIDES   ETHANOL   CBC AND DIFFERENTIAL    Narrative:     The following orders were created for panel order CBC & Differential.  Procedure                               Abnormality         Status                     ---------                               -----------         ------                     CBC Auto Differential[[id number]]        Abnormal            Final result                 Please view results for these tests on the individual orders.   GREEN TOP   LAVENDER TOP   GOLD TOP - SST   GRAY TOP   LIGHT BLUE TOP       Meds Given in ED:   Medications   Sodium Chloride (PF) 0.9 % 10 mL (has no administration in time range)   sodium chloride 0.9 % bolus 1,000 mL (1,000 mL Intravenous New Bag 8/21/24 1910)   sodium chloride 0.9 % bolus 1,000 mL (0 mL Intravenous Stopped 8/21/24 1914)   ondansetron ODT (ZOFRAN-ODT) disintegrating tablet 4 mg (4 mg Oral Given 8/21/24 1616)   oxyCODONE-acetaminophen (PERCOCET) 7.5-325 MG per tablet 1 tablet (1 tablet Oral Given 8/21/24 1616)   iopamidol (ISOVUE-300) 61 % injection 100 mL (85 mL Intravenous Given 8/21/24 1710)   prochlorperazine (COMPAZINE) injection 10 mg (10 mg  Intravenous Given 8/21/24 1910)   HYDROmorphone (DILAUDID) injection 0.5 mg (0.5 mg Intravenous Given 8/21/24 1910)           Last NIH score:                                                          Dysphagia screening results:  Patient Factors Component (Dysphagia:Stroke or Rule-out)  Best Eye Response: 4-->(E4) spontaneous (08/21/24 1612)  Best Motor Response: 6-->(M6) obeys commands (08/21/24 1612)  Best Verbal Response: 5-->(V5) oriented (08/21/24 1612)  [place] Coma Scale Score: 15 (08/21/24 1612)     [place] Coma Scale:  No data recorded     CIWA:        Restraint Type:            Isolation Status:  No active isolations

## 2024-08-22 PROBLEM — K85.90 PANCREATITIS: Status: ACTIVE | Noted: 2024-08-22

## 2024-08-22 LAB
ALBUMIN SERPL-MCNC: 3.6 G/DL (ref 3.5–5.2)
ALBUMIN/GLOB SERPL: 1.3 G/DL
ALP SERPL-CCNC: 85 U/L (ref 39–117)
ALT SERPL W P-5'-P-CCNC: 40 U/L (ref 1–41)
ANION GAP SERPL CALCULATED.3IONS-SCNC: 7 MMOL/L (ref 5–15)
AST SERPL-CCNC: 42 U/L (ref 1–40)
BASOPHILS # BLD AUTO: 0.06 10*3/MM3 (ref 0–0.2)
BASOPHILS NFR BLD AUTO: 0.7 % (ref 0–1.5)
BILIRUB SERPL-MCNC: 0.4 MG/DL (ref 0–1.2)
BUN SERPL-MCNC: 25 MG/DL (ref 8–23)
BUN/CREAT SERPL: 14.5 (ref 7–25)
CALCIUM SPEC-SCNC: 8.3 MG/DL (ref 8.6–10.5)
CHLORIDE SERPL-SCNC: 106 MMOL/L (ref 98–107)
CO2 SERPL-SCNC: 24 MMOL/L (ref 22–29)
CREAT SERPL-MCNC: 1.72 MG/DL (ref 0.76–1.27)
CREAT UR-MCNC: 59.1 MG/DL
DEPRECATED RDW RBC AUTO: 46.5 FL (ref 37–54)
EGFRCR SERPLBLD CKD-EPI 2021: 44.4 ML/MIN/1.73
EOSINOPHIL # BLD AUTO: 0.29 10*3/MM3 (ref 0–0.4)
EOSINOPHIL NFR BLD AUTO: 3.6 % (ref 0.3–6.2)
ERYTHROCYTE [DISTWIDTH] IN BLOOD BY AUTOMATED COUNT: 13.2 % (ref 12.3–15.4)
GLOBULIN UR ELPH-MCNC: 2.8 GM/DL
GLUCOSE BLDC GLUCOMTR-MCNC: 129 MG/DL (ref 70–130)
GLUCOSE BLDC GLUCOMTR-MCNC: 130 MG/DL (ref 70–130)
GLUCOSE BLDC GLUCOMTR-MCNC: 136 MG/DL (ref 70–130)
GLUCOSE BLDC GLUCOMTR-MCNC: 145 MG/DL (ref 70–130)
GLUCOSE BLDC GLUCOMTR-MCNC: 180 MG/DL (ref 70–130)
GLUCOSE BLDC GLUCOMTR-MCNC: 95 MG/DL (ref 70–130)
GLUCOSE SERPL-MCNC: 135 MG/DL (ref 65–99)
HBA1C MFR BLD: 8.3 % (ref 4.8–5.6)
HCT VFR BLD AUTO: 40.5 % (ref 37.5–51)
HGB BLD-MCNC: 12.8 G/DL (ref 13–17.7)
IMM GRANULOCYTES # BLD AUTO: 0.07 10*3/MM3 (ref 0–0.05)
IMM GRANULOCYTES NFR BLD AUTO: 0.9 % (ref 0–0.5)
LYMPHOCYTES # BLD AUTO: 1.51 10*3/MM3 (ref 0.7–3.1)
LYMPHOCYTES NFR BLD AUTO: 18.7 % (ref 19.6–45.3)
MCH RBC QN AUTO: 30 PG (ref 26.6–33)
MCHC RBC AUTO-ENTMCNC: 31.6 G/DL (ref 31.5–35.7)
MCV RBC AUTO: 95.1 FL (ref 79–97)
MONOCYTES # BLD AUTO: 0.56 10*3/MM3 (ref 0.1–0.9)
MONOCYTES NFR BLD AUTO: 6.9 % (ref 5–12)
NEUTROPHILS NFR BLD AUTO: 5.57 10*3/MM3 (ref 1.7–7)
NEUTROPHILS NFR BLD AUTO: 69.2 % (ref 42.7–76)
NRBC BLD AUTO-RTO: 0 /100 WBC (ref 0–0.2)
PLATELET # BLD AUTO: 168 10*3/MM3 (ref 140–450)
PMV BLD AUTO: 9.7 FL (ref 6–12)
POTASSIUM SERPL-SCNC: 5.5 MMOL/L (ref 3.5–5.2)
PROT SERPL-MCNC: 6.4 G/DL (ref 6–8.5)
QT INTERVAL: 368 MS
QTC INTERVAL: 450 MS
RBC # BLD AUTO: 4.26 10*6/MM3 (ref 4.14–5.8)
SODIUM SERPL-SCNC: 137 MMOL/L (ref 136–145)
WBC NRBC COR # BLD AUTO: 8.06 10*3/MM3 (ref 3.4–10.8)

## 2024-08-22 PROCEDURE — 63710000001 INSULIN LISPRO (HUMAN) PER 5 UNITS: Performed by: INTERNAL MEDICINE

## 2024-08-22 PROCEDURE — 83036 HEMOGLOBIN GLYCOSYLATED A1C: CPT | Performed by: NURSE PRACTITIONER

## 2024-08-22 PROCEDURE — 85025 COMPLETE CBC W/AUTO DIFF WBC: CPT | Performed by: NURSE PRACTITIONER

## 2024-08-22 PROCEDURE — 82948 REAGENT STRIP/BLOOD GLUCOSE: CPT

## 2024-08-22 PROCEDURE — 99233 SBSQ HOSP IP/OBS HIGH 50: CPT | Performed by: INTERNAL MEDICINE

## 2024-08-22 PROCEDURE — 80053 COMPREHEN METABOLIC PANEL: CPT | Performed by: NURSE PRACTITIONER

## 2024-08-22 RX ORDER — DEXTROSE MONOHYDRATE 25 G/50ML
25 INJECTION, SOLUTION INTRAVENOUS
Status: DISCONTINUED | OUTPATIENT
Start: 2024-08-22 | End: 2024-08-23 | Stop reason: HOSPADM

## 2024-08-22 RX ORDER — INSULIN LISPRO 100 [IU]/ML
2-7 INJECTION, SOLUTION INTRAVENOUS; SUBCUTANEOUS
Status: DISCONTINUED | OUTPATIENT
Start: 2024-08-22 | End: 2024-08-23 | Stop reason: HOSPADM

## 2024-08-22 RX ORDER — IBUPROFEN 600 MG/1
1 TABLET ORAL
Status: DISCONTINUED | OUTPATIENT
Start: 2024-08-22 | End: 2024-08-23 | Stop reason: HOSPADM

## 2024-08-22 RX ORDER — NICOTINE POLACRILEX 4 MG
15 LOZENGE BUCCAL
Status: DISCONTINUED | OUTPATIENT
Start: 2024-08-22 | End: 2024-08-23 | Stop reason: HOSPADM

## 2024-08-22 RX ADMIN — METOPROLOL TARTRATE 25 MG: 25 TABLET, FILM COATED ORAL at 21:41

## 2024-08-22 RX ADMIN — SENNOSIDES AND DOCUSATE SODIUM 2 TABLET: 50; 8.6 TABLET ORAL at 21:41

## 2024-08-22 RX ADMIN — SENNOSIDES AND DOCUSATE SODIUM 2 TABLET: 50; 8.6 TABLET ORAL at 08:49

## 2024-08-22 RX ADMIN — METOPROLOL TARTRATE 25 MG: 25 TABLET, FILM COATED ORAL at 08:49

## 2024-08-22 RX ADMIN — NICOTINE 1 PATCH: 14 PATCH, EXTENDED RELEASE TRANSDERMAL at 08:49

## 2024-08-22 RX ADMIN — INSULIN LISPRO 2 UNITS: 100 INJECTION, SOLUTION INTRAVENOUS; SUBCUTANEOUS at 17:09

## 2024-08-22 RX ADMIN — AMLODIPINE BESYLATE 10 MG: 10 TABLET ORAL at 08:49

## 2024-08-22 RX ADMIN — PANCRELIPASE 12000 UNITS OF LIPASE: 60000; 12000; 38000 CAPSULE, DELAYED RELEASE PELLETS ORAL at 18:28

## 2024-08-22 NOTE — CASE MANAGEMENT/SOCIAL WORK
Discharge Planning Assessment  [facility]     Patient Name: Dionte Dent  MRN: [id number]  Today's Date: 8/22/2024    Admit Date: 8/21/2024    Plan: HOME   Discharge Needs Assessment    No documentation.                  Discharge Plan       Row Name 08/22/24 1227       Plan    Plan HOME    Patient/Family in Agreement with Plan yes    Plan Comments Met with pt and wife at bedside for DCP.  They reside in [facility].  Pt is independent with ADLs.  No current DME or HH services.  [facility]  Goal is to return home upon DC.  No immediate needs identified/voiced.  CM will cont to follow.    Final Discharge Disposition Code 01 - home or self-care                  Continued Care and Services - Admitted Since 8/21/2024    No active coordination exists for this encounter.       Expected Discharge Date and Time       Expected Discharge Date Expected Discharge Time    Aug 23, 2024            Demographic Summary    No documentation.                  Functional Status    No documentation.                  Psychosocial    No documentation.                  Abuse/Neglect    No documentation.                  Legal    No documentation.                  Substance Abuse    No documentation.                  Patient Forms    No documentation.                     lEsy Rayo RN

## 2024-08-22 NOTE — PROGRESS NOTES
[facility] Medicine Services  PROGRESS NOTE    Patient Name: Dionte Dent  : 1960  MRN: [id number]    Date of Admission: 2024  Primary Care Physician: Orin Vera APRN    Subjective   Subjective     CC:  acute abd pain     HPI:  Abd pain much improved.  No nausea.  Has not had anything PO.      Last night he was noted to have apneic spells and was placed on 5L nc oxygen; now awake he is back on RA.        Objective   Objective     Vital Signs:   Temp:  [97 °F (36.1 °C)-98.6 °F (37 °C)] 98.6 °F (37 °C)  Heart Rate:  [53-92] 71  Resp:  [12-19] 18  BP: (107-159)/(66-93) 107/70  Flow (L/min):  [2-5] 2     Physical Exam:  Gen:  WD/WN man in bed.  NAD.  Wife present   Neuro: alert and oriented, clear speech, follows commands, grossly nonfocal  HEENT:  NC/AT   Neck:  Supple, no LAD  Heart RRR no murmur,  Abd:  Soft, nontender, no rebound no guarding   Extrem:  No c/c/e      Results Reviewed:  LAB RESULTS:      Lab 24  0537 24  1551   WBC 8.06 10.39   HEMOGLOBIN 12.8* 14.2   HEMATOCRIT 40.5 43.1   PLATELETS 168 202   NEUTROS ABS 5.57 7.52*   IMMATURE GRANS (ABS) 0.07* 0.07*   LYMPHS ABS 1.51 1.91   MONOS ABS 0.56 0.57   EOS ABS 0.29 0.26   MCV 95.1 92.7   PROCALCITONIN  --  0.20   LACTATE  --  1.4         Lab 24  0538 24  0537 24  1551   SODIUM 137  --  138   POTASSIUM 5.5*  --  4.9   CHLORIDE 106  --  104   CO2 24.0  --  22.0   ANION GAP 7.0  --  12.0   BUN 25*  --  32*   CREATININE 1.72*  --  2.02*   EGFR 44.4*  --  36.6*   GLUCOSE 135*  --  193*   CALCIUM 8.3*  --  8.6   HEMOGLOBIN A1C  --  8.30*  --          Lab 24  0538 24  1551   TOTAL PROTEIN 6.4 7.2   ALBUMIN 3.6 4.0   GLOBULIN 2.8 3.2   ALT (SGPT) 40 46*   AST (SGOT) 42* 47*   BILIRUBIN 0.4 0.3   ALK PHOS 85 98   LIPASE  --  118*         Lab 24  1551   PROBNP 56.7   HSTROP T 23*         Lab 24  1551   CHOLESTEROL 178   LDL CHOL 87   HDL CHOL 22*   TRIGLYCERIDES 416*  416*              Brief Urine Lab Results  (Last result in the past 365 days)        Color   Clarity   Blood   Leuk Est   Nitrite   Protein   CREAT   Urine HCG        08/21/24 2136             59.1                 Microbiology Results Abnormal       None            CT Abdomen Pelvis With Contrast    Result Date: 8/21/2024  CT ABDOMEN PELVIS W CONTRAST Date of Exam: 8/21/2024 5:02 PM EDT Indication: upper abd pain. hx of pancreatitis.. Comparison: None available. Technique: Axial CT images were obtained of the abdomen and pelvis following the uneventful intravenous administration of 85 mL Isovue-300. Reconstructed coronal and sagittal images were also obtained. Automated exposure control and iterative construction methods were used. Findings: Liver: The liver is prominent in size and decreased in attenuation. No focal liver lesion is seen. No biliary dilation is seen. Gallbladder: Unremarkable. Pancreas: There is a small fluid collection measuring 3.4 x 1.8 cm insinuating about the pancreatic tail. Mild surrounding stranding and additional mild fluid present. Findings likely represent sequela of subacute pancreatitis with developing small pseudocyst. CT follow-up is recommended. Pancreatic protocol MRI may also be considered for further evaluation. Spleen: Unremarkable. Adrenal glands: Unremarkable. Genitourinary tract: Small bilateral renal cysts and subcentimeter hypodensities which are too small to characterize. Kidneys are otherwise unremarkable. The ureters and urinary bladder appear unremarkable. Prostate gland is unremarkable. Gastrointestinal tract: The visualized hollow viscera demonstrate no focal lesion. There is no evidence of bowel obstruction. Appendix: No findings to suggest acute appendicitis. Other findings: No free air is identified. No pathologically enlarged lymph nodes are seen. Vascular calcifications are present. The IVC is unremarkable. Bones and soft tissues: No acute osseous lesion is identified.  There are degenerative changes within the spine. There are bilateral L4 pars defects with 15 mm anterolisthesis at L4-L5. Minimal retrolisthesis at L5-S1. Superficial soft tissues demonstrate  no acute abnormality. Lung bases: Scattered bibasilar atelectasis, edema, or mild atypical infiltrates. Calcified granuloma within the right lung base.     Impression: Impression: 1.There is a small fluid collection measuring 3.4 x 1.8 cm insinuating about the pancreatic tail. Mild surrounding stranding and additional mild fluid present. Findings likely represent sequela of subacute pancreatitis with developing small pseudocyst. CT follow-up is recommended. Pancreatic protocol MRI may also be considered for further evaluation. 2.Hepatic steatosis. 3.Additional findings as detailed above. Electronically Signed: Cody Ma MD  8/21/2024 5:26 PM EDT  Workstation ID: [id number]         Current medications:  Scheduled Meds:amLODIPine, 10 mg, Oral, Q24H  insulin lispro, 2-7 Units, Subcutaneous, 4x Daily AC & at Bedtime  metoprolol tartrate, 25 mg, Oral, Q12H  nicotine, 1 patch, Transdermal, Q24H  senna-docusate sodium, 2 tablet, Oral, BID      Continuous Infusions:   PRN Meds:.  senna-docusate sodium **AND** polyethylene glycol **AND** bisacodyl **AND** bisacodyl    dextrose    dextrose    glucagon (human recombinant)    HYDROmorphone    nitroglycerin    ondansetron ODT **OR** ondansetron    Sodium Chloride (PF)    Assessment & Plan   Assessment & Plan     Active Hospital Problems    Diagnosis  POA    **Acute pancreatitis [K85.90]  Yes    Pancreatitis [K85.90]  Yes    Type 2 diabetes mellitus [E11.9]  Yes    Essential hypertension [I10]  Yes    Elevated liver enzymes [R74.8]  Yes    Tobacco use [Z72.0]  Yes    DOUGLAS (acute kidney injury) [N17.9]  Unknown      Resolved Hospital Problems   No resolved problems to display.        Brief Hospital Course to date:  Dionte Dent is a 63 y.o. male w DM HTN pancreatitis and tobacco use.  Seen  at  for pancreatitis 8/6, left AMA    Pancreatitis, recurrent, idiopathic  - CT w stranding at tail   - recent  CT showed pseudocyst  - adv to clears  - follows w  GI, has plan for MRI on sept 17 due to sl abnl finding on CT   - is on creon    DOUGLAS  - baseline unclear; cr 2.0 on admission, 1.7 today   - continue hydration , hold ACEI     KIMBERLEE:  witnessed in house  - arrange outpt sleep study at [facility]   - NIPPV      elevated LFTs, mild  Fatty liver by CT    DM2 8.3   - fsbs      HTN      6) tobacco use    Expected Discharge Location and Transportation: home by car  Expected Discharge   Expected Discharge Date: 8/23/2024; Expected Discharge Time:      VTE Prophylaxis:  Mechanical VTE prophylaxis orders are present.         AM-PAC 6 Clicks Score (PT): 23 (08/22/24 0730)    CODE STATUS:   Code Status and Medical Interventions: CPR (Attempt to Resuscitate); Full Support   Ordered at: 08/21/24 1950     Level Of Support Discussed With:    Patient     Code Status (Patient has no pulse and is not breathing):    CPR (Attempt to Resuscitate)     Medical Interventions (Patient has pulse or is breathing):    Full Support       Pamella Fitzgerald MD  08/22/24

## 2024-08-22 NOTE — PLAN OF CARE
Goal Outcome Evaluation:  Plan of Care Reviewed With: patient        Progress: no change     A&Ox4, NSR. States he has no history of KIMBERLEE, however his SpO2 dropped to the 60-70% multiple times during brief periods of apnea throughout the night. 5L O2 N/C applied to help recover quickly.

## 2024-08-22 NOTE — PAYOR COMM NOTE
"Clara Dent (63 y.o. Male)       Date of Birth   1960    Social Security Number       Address   [address] [place] [address]    Home Phone   [phone]    MRN   [id number]       [facility]    Marital Status                               Admission Date   24    Admission Type   Emergency    Admitting Provider   Pamella Fitzgerald MD    Attending Provider   Pamella Fitzgerald MD    Department, Room/Bed   [facility] 4G, S449/1       Discharge Date       Discharge Disposition       Discharge Destination                                 Attending Provider: Pamella Fitzgerald MD    Allergies: Statins, Tagamet [Cimetidine]    Isolation: None   Infection: None   Code Status: CPR    Ht: 182.9 cm (72\")   Wt: 124 kg (272 lb 14.4 oz)    Admission Cmt: None   Principal Problem: Acute pancreatitis [K85.90]                   Active Insurance as of 2024       Primary Coverage       Payor Plan Insurance Group Employer/Plan Group    [facility] [id number]       Payor Plan Address Payor Plan Phone Number Payor Plan Fax Number Effective Dates    [address] [phone]  2024 - None Entered    [place] [address]         Subscriber Name Subscriber Birth Date Member ID       CLARA DENT 1960 [id number]                     Emergency Contacts        (Rel.) Home Phone Work Phone Mobile Phone    STEPHANIE DENT (Spouse) [phone] -- [phone]                 History & Physical        Velia Reilly APRN at 24 1934       Attestation signed by Woo Houston MD at 24 2300    I have reviewed this documentation and agree.                      [facility] Medicine Services  HISTORY AND PHYSICAL    Patient Name: Clara Dent  : 1960  MRN: [id number]  Primary Care Physician: Orin Vera APRN  Date of admission: 2024    Subjective  Subjective     Chief Complaint:  Nausea, vomiting, upper abdominal " pain    HPI:  Dionte Dent is a 63 y.o. male past medical history significant for diabetes mellitus type 2, essential hypertension, pancreatitis and tobacco use presents to the ED with complaints of worsening nausea, vomiting and upper abdominal pain over the past month.  Patient initially presented to [facility] on 8/6/2024 for the above symptoms and CT of abdomen and pelvis without contrast revealed acute pancreatitis with 3 x 3.5 cm pseudocyst.  Due to having no available rooms patient declined to stay in hallway bed and left AMA.  Patient states his symptoms have not improved.  He denies any fever, chills, cough, shortness of breath, diarrhea, dysuria or chest pain.  CT of abdomen and pelvis with contrast tonight notes small fluid collection measuring 3.4 x 1.8 cm insinuating about the pancreatic tail.  Mild surrounding stranding and additional mild fluid present.  Likely represents sequela of subacute pancreatitis with developing small pseudocyst.  Patient tells me his last flareup with pancreatitis was about a year and a half ago.  He additionally states in the past they have not found underlying cause of his pancreatitis flares.        Review of Systems   Constitutional:  Positive for appetite change. Negative for activity change, chills, diaphoresis, fatigue, fever and unexpected weight change.   HENT: Negative.     Eyes:  Negative for photophobia and visual disturbance.   Respiratory:  Negative for shortness of breath.    Cardiovascular:  Negative for chest pain, palpitations and leg swelling.   Gastrointestinal:  Positive for abdominal pain, nausea and vomiting. Negative for abdominal distention, blood in stool, constipation and diarrhea.   Genitourinary:  Negative for difficulty urinating, dysuria, frequency and hematuria.   Musculoskeletal:  Negative for back pain, neck pain and neck stiffness.   Skin: Negative.    Neurological:  Negative for dizziness, speech difficulty, weakness, light-headedness,  numbness and headaches.   Psychiatric/Behavioral: Negative.                  Personal History     Past Medical History:   Diagnosis Date    Diabetes mellitus     Hypertension              No past surgical history on file.    Family History:  family history includes Hypertension in his father.     Social History:  reports that he has been smoking cigarettes. He does not have any smokeless tobacco history on file. He reports that he does not drink alcohol and does not use drugs.  Social History     Social History Narrative    Not on file       Medications:       Allergies   Allergen Reactions    Statins Nausea And Vomiting    Tagamet [Cimetidine] Unknown - High Severity       Objective  Objective     Vital Signs:   Temp:  [98.2 °F (36.8 °C)] 98.2 °F (36.8 °C)  Heart Rate:  [66-92] 66  Resp:  [18] 18  BP: (120-159)/(76-93) 127/76    Physical Exam  Vitals and nursing note reviewed.   Constitutional:       General: He is not in acute distress.     Appearance: Normal appearance. He is not ill-appearing, toxic-appearing or diaphoretic.   HENT:      Head: Normocephalic and atraumatic.      Nose: Nose normal.      Mouth/Throat:      Mouth: Mucous membranes are dry.      Pharynx: Oropharynx is clear.   Eyes:      Extraocular Movements: Extraocular movements intact.      Pupils: Pupils are equal, round, and reactive to light.   Cardiovascular:      Rate and Rhythm: Normal rate and regular rhythm.      Pulses: Normal pulses.      Heart sounds: Normal heart sounds.   Pulmonary:      Effort: Pulmonary effort is normal.      Breath sounds: Normal breath sounds.   Abdominal:      General: Bowel sounds are normal. There is no distension.      Palpations: There is no mass.      Tenderness: There is abdominal tenderness. There is no right CVA tenderness, left CVA tenderness, guarding or rebound.      Hernia: No hernia is present.      Comments: Epigastric and left upper quadrant tenderness with exam   Musculoskeletal:         General:  No swelling, tenderness, deformity or signs of injury. Normal range of motion.      Cervical back: Normal range of motion and neck supple.      Right lower leg: No edema.      Left lower leg: No edema.   Skin:     General: Skin is warm and dry.   Neurological:      General: No focal deficit present.      Mental Status: He is alert and oriented to person, place, and time. Mental status is at baseline.   Psychiatric:         Mood and Affect: Mood normal.         Behavior: Behavior normal.         Thought Content: Thought content normal.         Judgment: Judgment normal.            Result Review:  I have personally reviewed the results from the time of this admission to 8/21/2024 19:34 EDT and agree with these findings:  [x]  Laboratory list / accordion  [x]  Microbiology  [x]  Radiology  [x]  EKG/Telemetry   []  Cardiology/Vascular   []  Pathology  []  Old records  []  Other:  Most notable findings include:     LAB RESULTS:      Lab 08/21/24  1551   WBC 10.39   HEMOGLOBIN 14.2   HEMATOCRIT 43.1   PLATELETS 202   NEUTROS ABS 7.52*   IMMATURE GRANS (ABS) 0.07*   LYMPHS ABS 1.91   MONOS ABS 0.57   EOS ABS 0.26   MCV 92.7   LACTATE 1.4         Lab 08/21/24  1551   SODIUM 138   POTASSIUM 4.9   CHLORIDE 104   CO2 22.0   ANION GAP 12.0   BUN 32*   CREATININE 2.02*   EGFR 36.6*   GLUCOSE 193*   CALCIUM 8.6         Lab 08/21/24  1551   TOTAL PROTEIN 7.2   ALBUMIN 4.0   GLOBULIN 3.2   ALT (SGPT) 46*   AST (SGOT) 47*   BILIRUBIN 0.3   ALK PHOS 98   LIPASE 118*         Lab 08/21/24  1551   PROBNP 56.7   HSTROP T 23*                 Brief Urine Lab Results  (Last result in the past 365 days)        Color   Clarity   Blood   Leuk Est   Nitrite   Protein   CREAT   Urine HCG        08/21/24 1543 Yellow   Clear   Moderate (2+)   Negative   Negative   100 mg/dL (2+)                 Microbiology Results (last 10 days)       ** No results found for the last 240 hours. **            CT Abdomen Pelvis With Contrast    Result Date:  8/21/2024  CT ABDOMEN PELVIS W CONTRAST Date of Exam: 8/21/2024 5:02 PM EDT Indication: upper abd pain. hx of pancreatitis.. Comparison: None available. Technique: Axial CT images were obtained of the abdomen and pelvis following the uneventful intravenous administration of 85 mL Isovue-300. Reconstructed coronal and sagittal images were also obtained. Automated exposure control and iterative construction methods were used. Findings: Liver: The liver is prominent in size and decreased in attenuation. No focal liver lesion is seen. No biliary dilation is seen. Gallbladder: Unremarkable. Pancreas: There is a small fluid collection measuring 3.4 x 1.8 cm insinuating about the pancreatic tail. Mild surrounding stranding and additional mild fluid present. Findings likely represent sequela of subacute pancreatitis with developing small pseudocyst. CT follow-up is recommended. Pancreatic protocol MRI may also be considered for further evaluation. Spleen: Unremarkable. Adrenal glands: Unremarkable. Genitourinary tract: Small bilateral renal cysts and subcentimeter hypodensities which are too small to characterize. Kidneys are otherwise unremarkable. The ureters and urinary bladder appear unremarkable. Prostate gland is unremarkable. Gastrointestinal tract: The visualized hollow viscera demonstrate no focal lesion. There is no evidence of bowel obstruction. Appendix: No findings to suggest acute appendicitis. Other findings: No free air is identified. No pathologically enlarged lymph nodes are seen. Vascular calcifications are present. The IVC is unremarkable. Bones and soft tissues: No acute osseous lesion is identified. There are degenerative changes within the spine. There are bilateral L4 pars defects with 15 mm anterolisthesis at L4-L5. Minimal retrolisthesis at L5-S1. Superficial soft tissues demonstrate  no acute abnormality. Lung bases: Scattered bibasilar atelectasis, edema, or mild atypical infiltrates. Calcified  granuloma within the right lung base.     Impression: Impression: 1.There is a small fluid collection measuring 3.4 x 1.8 cm insinuating about the pancreatic tail. Mild surrounding stranding and additional mild fluid present. Findings likely represent sequela of subacute pancreatitis with developing small pseudocyst. CT follow-up is recommended. Pancreatic protocol MRI may also be considered for further evaluation. 2.Hepatic steatosis. 3.Additional findings as detailed above. Electronically Signed: Cody Ma MD  8/21/2024 5:26 PM EDT  Workstation ID: [id number]         Assessment & Plan  Assessment & Plan       Type 2 diabetes mellitus    Essential hypertension    Elevated liver enzymes    Tobacco use    Acute pancreatitis    DOUGLAS (acute kidney injury)      63-year-old male presents the ED with worsening abdominal pain, nausea and vomiting over the past 4 weeks.    1) acute pancreatitis  - CT abdomen pelvis with contrast as mentioned above  - N.p.o.  - IV fluids  - Pain control  - As needed antiemetics  - Check FLP  - Ethanol pending  - Pro-Fredrick pending    2) DOUGLAS  - Likely secondary to volume depletion  - Continue IV fluids  - Check urine studies  - Consider nephrology consult if no improvement  -Strict I's and O's    3) elevated LFTs, mild  -Check hepatitis panel  - CT abdomen pelvis noted hepatic steatosis  -Repeat labs in a.m.    4) diabetes mellitus type 2  - Check hemoglobin A1c  - Fingersticks before meals and at bedtime  - Start sliding scale insulin    5) essential hypertension  - Continue amlodipine, metoprolol  - Hold lisinopril due to #2    6) tobacco use  - Cessation education provided  - Nicotine patch      DVT prophylaxis:  scds    CODE STATUS:  Full Code        Expected Discharge  TBD     This note has been completed as part of a split-shared workflow.     Signature: Electronically signed by RAYMON Montero, 08/21/24, 7:46 PM EDT.                  Electronically signed by Woo Houston MD at  24 2300          Physician Progress Notes (all)        Pamella Fitzgerald MD at 24 0816              [facility] Medicine Services  PROGRESS NOTE    Patient Name: Dionte Dent YOB: 1960  MRN: [id number]    Date of Admission: 2024  Primary Care Physician: Orin Vera APRN    Subjective   Subjective     CC:  acute abd pain     HPI:  Abd pain much improved.  No nausea.  Has not had anything PO.      Last night he was noted to have apneic spells and was placed on 5L nc oxygen; now awake he is back on RA.        Objective   Objective     Vital Signs:   Temp:  [97 °F (36.1 °C)-98.6 °F (37 °C)] 98.6 °F (37 °C)  Heart Rate:  [53-92] 71  Resp:  [12-19] 18  BP: (107-159)/(66-93) 107/70  Flow (L/min):  [2-5] 2     Physical Exam:  Gen:  WD/WN man in bed.  NAD.  Wife present   Neuro: alert and oriented, clear speech, follows commands, grossly nonfocal  HEENT:  NC/AT   Neck:  Supple, no LAD  Heart RRR no murmur,  Abd:  Soft, nontender, no rebound no guarding   Extrem:  No c/c/e      Results Reviewed:  LAB RESULTS:      Lab 24  0537 24  1551   WBC 8.06 10.39   HEMOGLOBIN 12.8* 14.2   HEMATOCRIT 40.5 43.1   PLATELETS 168 202   NEUTROS ABS 5.57 7.52*   IMMATURE GRANS (ABS) 0.07* 0.07*   LYMPHS ABS 1.51 1.91   MONOS ABS 0.56 0.57   EOS ABS 0.29 0.26   MCV 95.1 92.7   PROCALCITONIN  --  0.20   LACTATE  --  1.4         Lab 24  0538 24  0537 24  1551   SODIUM 137  --  138   POTASSIUM 5.5*  --  4.9   CHLORIDE 106  --  104   CO2 24.0  --  22.0   ANION GAP 7.0  --  12.0   BUN 25*  --  32*   CREATININE 1.72*  --  2.02*   EGFR 44.4*  --  36.6*   GLUCOSE 135*  --  193*   CALCIUM 8.3*  --  8.6   HEMOGLOBIN A1C  --  8.30*  --          Lab 24  0538 24  1551   TOTAL PROTEIN 6.4 7.2   ALBUMIN 3.6 4.0   GLOBULIN 2.8 3.2   ALT (SGPT) 40 46*   AST (SGOT) 42* 47*   BILIRUBIN 0.4 0.3   ALK PHOS 85 98   LIPASE  --  118*         Lab 24  1551   PROBNP 56.7   HSTROP T  23*         Lab 08/21/24  1551   CHOLESTEROL 178   LDL CHOL 87   HDL CHOL 22*   TRIGLYCERIDES 416*  416*             Brief Urine Lab Results  (Last result in the past 365 days)        Color   Clarity   Blood   Leuk Est   Nitrite   Protein   CREAT   Urine HCG        08/21/24 2136             59.1                 Microbiology Results Abnormal       None            CT Abdomen Pelvis With Contrast    Result Date: 8/21/2024  CT ABDOMEN PELVIS W CONTRAST Date of Exam: 8/21/2024 5:02 PM EDT Indication: upper abd pain. hx of pancreatitis.. Comparison: None available. Technique: Axial CT images were obtained of the abdomen and pelvis following the uneventful intravenous administration of 85 mL Isovue-300. Reconstructed coronal and sagittal images were also obtained. Automated exposure control and iterative construction methods were used. Findings: Liver: The liver is prominent in size and decreased in attenuation. No focal liver lesion is seen. No biliary dilation is seen. Gallbladder: Unremarkable. Pancreas: There is a small fluid collection measuring 3.4 x 1.8 cm insinuating about the pancreatic tail. Mild surrounding stranding and additional mild fluid present. Findings likely represent sequela of subacute pancreatitis with developing small pseudocyst. CT follow-up is recommended. Pancreatic protocol MRI may also be considered for further evaluation. Spleen: Unremarkable. Adrenal glands: Unremarkable. Genitourinary tract: Small bilateral renal cysts and subcentimeter hypodensities which are too small to characterize. Kidneys are otherwise unremarkable. The ureters and urinary bladder appear unremarkable. Prostate gland is unremarkable. Gastrointestinal tract: The visualized hollow viscera demonstrate no focal lesion. There is no evidence of bowel obstruction. Appendix: No findings to suggest acute appendicitis. Other findings: No free air is identified. No pathologically enlarged lymph nodes are seen. Vascular  calcifications are present. The IVC is unremarkable. Bones and soft tissues: No acute osseous lesion is identified. There are degenerative changes within the spine. There are bilateral L4 pars defects with 15 mm anterolisthesis at L4-L5. Minimal retrolisthesis at L5-S1. Superficial soft tissues demonstrate  no acute abnormality. Lung bases: Scattered bibasilar atelectasis, edema, or mild atypical infiltrates. Calcified granuloma within the right lung base.     Impression: Impression: 1.There is a small fluid collection measuring 3.4 x 1.8 cm insinuating about the pancreatic tail. Mild surrounding stranding and additional mild fluid present. Findings likely represent sequela of subacute pancreatitis with developing small pseudocyst. CT follow-up is recommended. Pancreatic protocol MRI may also be considered for further evaluation. 2.Hepatic steatosis. 3.Additional findings as detailed above. Electronically Signed: Cody Ma MD  8/21/2024 5:26 PM EDT  Workstation ID: [id number]         Current medications:  Scheduled Meds:amLODIPine, 10 mg, Oral, Q24H  insulin lispro, 2-7 Units, Subcutaneous, 4x Daily AC & at Bedtime  metoprolol tartrate, 25 mg, Oral, Q12H  nicotine, 1 patch, Transdermal, Q24H  senna-docusate sodium, 2 tablet, Oral, BID      Continuous Infusions:   PRN Meds:.  senna-docusate sodium **AND** polyethylene glycol **AND** bisacodyl **AND** bisacodyl    dextrose    dextrose    glucagon (human recombinant)    HYDROmorphone    nitroglycerin    ondansetron ODT **OR** ondansetron    Sodium Chloride (PF)    Assessment & Plan   Assessment & Plan     Active Hospital Problems    Diagnosis  POA    **Acute pancreatitis [K85.90]  Yes    Pancreatitis [K85.90]  Yes    Type 2 diabetes mellitus [E11.9]  Yes    Essential hypertension [I10]  Yes    Elevated liver enzymes [R74.8]  Yes    Tobacco use [Z72.0]  Yes    DOUGLAS (acute kidney injury) [N17.9]  Unknown      Resolved Hospital Problems   No resolved problems to  display.        Brief Hospital Course to date:  Dionte Dent is a 63 y.o. male w DM HTN pancreatitis and tobacco use.  Seen at  for pancreatitis 8/6, left AMA    Pancreatitis, recurrent, idiopathic  - CT w stranding at tail   - recent  CT showed pseudocyst  - adv to clears  - follows w [facility], has plan for MRI on sept 17 due to sl abnl finding on CT   - is on creon    DOUGLAS  - baseline unclear; cr 2.0 on admission, 1.7 today   - continue hydration , hold ACEI     KIMBERLEE:  witnessed in house  - arrange outpt sleep study at DC   - NIPPV      elevated LFTs, mild  Fatty liver by CT    DM2 8.3   - fsbs      HTN      6) tobacco use    Expected Discharge Location and Transportation: home by car  Expected Discharge   Expected Discharge Date: 8/23/2024; Expected Discharge Time:      VTE Prophylaxis:  Mechanical VTE prophylaxis orders are present.         AM-PAC 6 Clicks Score (PT): 23 (08/22/24 0730)    CODE STATUS:   Code Status and Medical Interventions: CPR (Attempt to Resuscitate); Full Support   Ordered at: 08/21/24 1327     Level Of Support Discussed With:    Patient     Code Status (Patient has no pulse and is not breathing):    CPR (Attempt to Resuscitate)     Medical Interventions (Patient has pulse or is breathing):    Full Support       Pamella Fitzgerald MD  08/22/24        Electronically signed by Pamella Fitzgerald MD at 08/22/24 5345

## 2024-08-23 VITALS
TEMPERATURE: 97.9 F | RESPIRATION RATE: 18 BRPM | DIASTOLIC BLOOD PRESSURE: 81 MMHG | OXYGEN SATURATION: 95 % | BODY MASS INDEX: 36.96 KG/M2 | HEIGHT: 72 IN | WEIGHT: 272.9 LBS | HEART RATE: 66 BPM | SYSTOLIC BLOOD PRESSURE: 128 MMHG

## 2024-08-23 LAB
ANION GAP SERPL CALCULATED.3IONS-SCNC: 10 MMOL/L (ref 5–15)
BUN SERPL-MCNC: 21 MG/DL (ref 8–23)
BUN/CREAT SERPL: 14.2 (ref 7–25)
CALCIUM SPEC-SCNC: 9.2 MG/DL (ref 8.6–10.5)
CHLORIDE SERPL-SCNC: 105 MMOL/L (ref 98–107)
CO2 SERPL-SCNC: 24 MMOL/L (ref 22–29)
CREAT SERPL-MCNC: 1.48 MG/DL (ref 0.76–1.27)
EGFRCR SERPLBLD CKD-EPI 2021: 53.2 ML/MIN/1.73
GLUCOSE BLDC GLUCOMTR-MCNC: 123 MG/DL (ref 70–130)
GLUCOSE BLDC GLUCOMTR-MCNC: 131 MG/DL (ref 70–130)
GLUCOSE SERPL-MCNC: 137 MG/DL (ref 65–99)
POTASSIUM SERPL-SCNC: 5 MMOL/L (ref 3.5–5.2)
SODIUM SERPL-SCNC: 139 MMOL/L (ref 136–145)

## 2024-08-23 PROCEDURE — 99239 HOSP IP/OBS DSCHRG MGMT >30: CPT | Performed by: INTERNAL MEDICINE

## 2024-08-23 PROCEDURE — 82948 REAGENT STRIP/BLOOD GLUCOSE: CPT

## 2024-08-23 PROCEDURE — 80048 BASIC METABOLIC PNL TOTAL CA: CPT | Performed by: INTERNAL MEDICINE

## 2024-08-23 RX ORDER — COLCHICINE 0.6 MG/1
0.6 TABLET ORAL DAILY PRN
Start: 2024-08-23

## 2024-08-23 RX ADMIN — METOPROLOL TARTRATE 25 MG: 25 TABLET, FILM COATED ORAL at 09:06

## 2024-08-23 RX ADMIN — PANCRELIPASE 12000 UNITS OF LIPASE: 60000; 12000; 38000 CAPSULE, DELAYED RELEASE PELLETS ORAL at 13:05

## 2024-08-23 RX ADMIN — NICOTINE 1 PATCH: 14 PATCH, EXTENDED RELEASE TRANSDERMAL at 09:08

## 2024-08-23 RX ADMIN — SENNOSIDES AND DOCUSATE SODIUM 2 TABLET: 50; 8.6 TABLET ORAL at 09:05

## 2024-08-23 RX ADMIN — AMLODIPINE BESYLATE 10 MG: 10 TABLET ORAL at 09:07

## 2024-08-23 NOTE — DISCHARGE SUMMARY
[facility]  DISCHARGE SUMMARY    Patient Name: Dionte Dent  : 1960  MRN: [id number]    Date of Admission: 2024  4:01 PM  Date of Discharge:  2024  Primary Care Physician: Orin Vera, RAYMON    Consults       No orders found from 2024 to 2024.            Hospital Course     Presenting Problem: abdominal pain     Active Hospital Problems    Diagnosis  POA   • **Acute pancreatitis [K85.90]  Yes   • Pancreatitis [K85.90]  Yes   • Type 2 diabetes mellitus [E11.9]  Yes   • Essential hypertension [I10]  Yes   • Elevated liver enzymes [R74.8]  Yes   • Tobacco use [Z72.0]  Yes   • DOUGLAS (acute kidney injury) [N17.9]  Unknown      Resolved Hospital Problems   No resolved problems to display.          Hospital Course:  Dionte Dent is a 63 y.o. male who lives at home.  He has DM HTN and history of chronic/recurrent pancreatitis followed at [facility], and ongoing tobacco use.  He was recently seen at  for abdominal pain on , was diagnosed w flare of pancreatitis , but left AMA because he was made to wait in a hallway due to lack of beds.       Pancreatitis, recurrent, idiopathic  - CT here showed stranding at tail of pancreas   - recent  CT showed pseudocyst  - pain subsided quickly and diet was advanced.  Pt and wife noted that all the fatty foods he eats cause abd pain.  I advised him to stop eating fatty foods.    - follows w  GI, has plan for MRI on  due to sl abnl finding on CT   - is on creon:  continue      DOUGLAS, HTN  - baseline unclear; cr 2.0 on admission, 1.7 then 1.5 after hydration.   He is now tolerating food.   - continue hydration   - ACEI was held on admission due to elevated creatinine.  Off of this, he has not been hypertensive.  Will continue to hold it at discharge.      KIMBERLEE:  witnessed in house.   His sats fell repeatedly while he slept and he had apnic spells.    - outpt sleep study at [facility]   - NIPPV ordered in house but he  tolerated it poorly.  I encouraged him to proceed with sleep study.       elevated LFTs, mild  Fatty liver by CT     DM2 8.3   - fsbs        Discharge Follow Up Recommendations for outpatient labs/diagnostics:   - keep appointment with [facility] for Sept 17-18, reportedly has MRI scheduled of pancreas, then GI clinic appt    - Needs outpt sleep study for clinically evident KIMBERLEE     Day of Discharge     HPI:   tolerating full liquids, no nausea, no abd pain     Review of Systems  No vomiting     Vital Signs:   Temp:  [97.9 °F (36.6 °C)-98 °F (36.7 °C)] 97.9 °F (36.6 °C)  Heart Rate:  [58-91] 66  Resp:  [18] 18  BP: (123-134)/(81-92) 128/81  Flow (L/min):  [2] 2      Physical Exam:  Gen:  WD/WN  Neuro: alert and oriented, clear speech, follows commands, grossly nonfocal  HEENT:  NC/AT PERRL, OP benign  Neck:  Supple, no LAD  Heart RRR no murmur, rub, or gallop  Abd:  Soft, nontender, no rebound or guarding, pos BS  Extrem:  No c/c/e      Pertinent  and/or Most Recent Results     LAB RESULTS:      Lab 08/22/24  0537 08/21/24  1551   WBC 8.06 10.39   HEMOGLOBIN 12.8* 14.2   HEMATOCRIT 40.5 43.1   PLATELETS 168 202   NEUTROS ABS 5.57 7.52*   IMMATURE GRANS (ABS) 0.07* 0.07*   LYMPHS ABS 1.51 1.91   MONOS ABS 0.56 0.57   EOS ABS 0.29 0.26   MCV 95.1 92.7   PROCALCITONIN  --  0.20   LACTATE  --  1.4         Lab 08/23/24  0544 08/22/24  0538 08/22/24  0537 08/21/24  1551   SODIUM 139 137  --  138   POTASSIUM 5.0 5.5*  --  4.9   CHLORIDE 105 106  --  104   CO2 24.0 24.0  --  22.0   ANION GAP 10.0 7.0  --  12.0   BUN 21 25*  --  32*   CREATININE 1.48* 1.72*  --  2.02*   EGFR 53.2* 44.4*  --  36.6*   GLUCOSE 137* 135*  --  193*   CALCIUM 9.2 8.3*  --  8.6   HEMOGLOBIN A1C  --   --  8.30*  --          Lab 08/22/24  0538 08/21/24  1551   TOTAL PROTEIN 6.4 7.2   ALBUMIN 3.6 4.0   GLOBULIN 2.8 3.2   ALT (SGPT) 40 46*   AST (SGOT) 42* 47*   BILIRUBIN 0.4 0.3   ALK PHOS 85 98   LIPASE  --  118*         Lab 08/21/24  1551   PROBNP 56.7    HSTROP T 23*         Lab 08/21/24  1551   CHOLESTEROL 178   LDL CHOL 87   HDL CHOL 22*   TRIGLYCERIDES 416*  416*             Brief Urine Lab Results  (Last result in the past 365 days)        Color   Clarity   Blood   Leuk Est   Nitrite   Protein   CREAT   Urine HCG        08/21/24 2136             59.1               Microbiology Results (last 10 days)       ** No results found for the last 240 hours. **            CT Abdomen Pelvis With Contrast    Result Date: 8/21/2024  CT ABDOMEN PELVIS W CONTRAST Date of Exam: 8/21/2024 5:02 PM EDT Indication: upper abd pain. hx of pancreatitis.. Comparison: None available. Technique: Axial CT images were obtained of the abdomen and pelvis following the uneventful intravenous administration of 85 mL Isovue-300. Reconstructed coronal and sagittal images were also obtained. Automated exposure control and iterative construction methods were used. Findings: Liver: The liver is prominent in size and decreased in attenuation. No focal liver lesion is seen. No biliary dilation is seen. Gallbladder: Unremarkable. Pancreas: There is a small fluid collection measuring 3.4 x 1.8 cm insinuating about the pancreatic tail. Mild surrounding stranding and additional mild fluid present. Findings likely represent sequela of subacute pancreatitis with developing small pseudocyst. CT follow-up is recommended. Pancreatic protocol MRI may also be considered for further evaluation. Spleen: Unremarkable. Adrenal glands: Unremarkable. Genitourinary tract: Small bilateral renal cysts and subcentimeter hypodensities which are too small to characterize. Kidneys are otherwise unremarkable. The ureters and urinary bladder appear unremarkable. Prostate gland is unremarkable. Gastrointestinal tract: The visualized hollow viscera demonstrate no focal lesion. There is no evidence of bowel obstruction. Appendix: No findings to suggest acute appendicitis. Other findings: No free air is identified. No  pathologically enlarged lymph nodes are seen. Vascular calcifications are present. The IVC is unremarkable. Bones and soft tissues: No acute osseous lesion is identified. There are degenerative changes within the spine. There are bilateral L4 pars defects with 15 mm anterolisthesis at L4-L5. Minimal retrolisthesis at L5-S1. Superficial soft tissues demonstrate  no acute abnormality. Lung bases: Scattered bibasilar atelectasis, edema, or mild atypical infiltrates. Calcified granuloma within the right lung base.     Impression: 1.There is a small fluid collection measuring 3.4 x 1.8 cm insinuating about the pancreatic tail. Mild surrounding stranding and additional mild fluid present. Findings likely represent sequela of subacute pancreatitis with developing small pseudocyst. CT follow-up is recommended. Pancreatic protocol MRI may also be considered for further evaluation. 2.Hepatic steatosis. 3.Additional findings as detailed above. Electronically Signed: Cody Ma MD  8/21/2024 5:26 PM EDT  Workstation ID: [id number]                 Discharge Details        Discharge Medications        Changes to Medications        Instructions Start Date   colchicine 0.6 MG tablet  What changed:   when to take this  reasons to take this   0.6 mg, Oral, Daily PRN             Continue These Medications        Instructions Start Date   allopurinol 100 MG tablet  Commonly known as: ZYLOPRIM   1 tablet, Oral, Daily      amLODIPine 10 MG tablet  Commonly known as: NORVASC   1 tablet, Oral, Daily      Creon [id number] units capsule delayed-release particles capsule  Generic drug: Pancrelipase (Lip-Prot-Amyl)   12,000 units of lipase, Oral, 3 Times Daily With Meals      cyclobenzaprine 10 MG tablet  Commonly known as: FLEXERIL   10 mg, Oral, 3 Times Daily PRN      ezetimibe 10 MG tablet  Commonly known as: ZETIA   10 mg, Oral, Daily      HYDROcodone-acetaminophen 5-325 MG per tablet  Commonly known as: NORCO   1 tablet, Oral, Every  6 Hours PRN      metFORMIN 500 MG tablet  Commonly known as: GLUCOPHAGE   1 tablet, Oral, Daily      metoprolol tartrate 25 MG tablet  Commonly known as: LOPRESSOR   1 tablet, Oral, 2 Times Daily      ondansetron ODT 4 MG disintegrating tablet  Commonly known as: ZOFRAN-ODT   4 mg, Translingual      pantoprazole 40 MG EC tablet  Commonly known as: PROTONIX   40 mg, Oral, Daily      vitamin D3 125 MCG (5000 UT) capsule capsule   5,000 Units, Oral, Daily             Stop These Medications      lisinopril 40 MG tablet  Commonly known as: PRINIVIL,ZESTRIL              Allergies   Allergen Reactions   • Statins Nausea And Vomiting   • Tagamet [Cimetidine] Unknown - High Severity         Discharge Disposition:  Home or Self Care    Diet:  Avoid fatty foods          CODE STATUS:    Code Status and Medical Interventions: CPR (Attempt to Resuscitate); Full Support   Ordered at: 08/21/24 1950     Level Of Support Discussed With:    Patient     Code Status (Patient has no pulse and is not breathing):    CPR (Attempt to Resuscitate)     Medical Interventions (Patient has pulse or is breathing):    Full Support       Additional Instructions for the Follow-ups that You Need to Schedule       Ambulatory Referral to Sleep Medicine   As directed      Follow-up needed: Yes        Discharge Follow-up with Specialty:  GI clinic as scheduled in Sept 2024   As directed      Specialty:  GI clinic as scheduled in Sept 2024                      Pamella Fitzgerald MD  08/23/24      Time Spent on Discharge:  I spent  40  minutes on this discharge activity which included: face-to-face encounter with the patient, reviewing the data in the system, coordination of the care with the nursing staff as well as consultants, documentation, and entering orders.

## 2024-08-23 NOTE — PLAN OF CARE
Goal Outcome Evaluation:           Progress: improving               Aox4, NSR, RA while awake, VSS, 4L NC while asleep- pt refused cpap despite education. Stated he does not have KIMBERLEE. Continues to desat to 50s while asleep, very clear apnea, gasping for air when he is awakened. No c/o of pain overnight, no abdominal tenderness upon palpation.

## 2024-08-23 NOTE — CONSULTS
Clinical Nutrition     Nutrition Education   Reason for Visit:   Physician Consult       Patient Name: Dionte Dent  YOB: 1960  MRN: [id number]  Date of Encounter: 08/23/24 13:31 EDT  Admission date: 8/21/2024    Comments:     Applicable Diagnoses   - Acute inflammation of the pancreas       Diet/Nutrition Related History:     Patient and wife in room at time of visit. Patient stated his wife cooks meals at home mostly, pt reports he is a  but is home for breakfast and dinner. An average day of eating per patient is a frye, egg and cheese biscuit for breakfast, lunch is something his wife packs - leftovers, soup beans and cornbread, fruit, cottage cheese, and for dinner it could be fried foods, or canned soups. Wife states that they do not eat out often but if they do it is fast food.  Wife asked appropriate questions about diet modifications, specifically as patient does enjoy his breakfast sandwiches, swapping to toast, low fat cheese, and lower fat meat would be appropriate choices. Educated pt and wife about swapping to a lower fat milk, limiting processed meats, choosing cooking spray over deep fat frying or pan frying.   Limited engagement and reception from patient, wife more interested in education information.    Labs reviewed   Yes    Nutrition Diagnosis     Problem Food and nutrition knowledge deficit   Related To Acute pancreatitis   Signs/Symptoms Pt with prior lack of knowledge of diet for condition     Goal:     Increase knowledge on diet/nutrition effects on condition/status     Nutrition Intervention     Education provided regarding nutrition therapy for: Diet rationale, Key food habit change, and Other (pancreatitis edu)    Education provided regarding food habits/behavior related to:Food choices, Eating pattern, Label reading, Food preparation, and Meal planning     RD provided printed material via [employer]-Nutrition Care  Manual       Monitoring/Evaluation:     Pt acknowledged understanding of material covered, denied any further questions of materials covered.    Izzy Espinosa RD eligible  Time Spent: 20 min

## 2024-08-30 NOTE — PROGRESS NOTES
"Enter Query Response Below      Query Response: unable to determine              If applicable, please update the problem list.       Patient: Dionte Dent        : 1960  Account: [id number]           Admit Date: 2024        How to Respond to this query:       a. Click New Note     b. Answer query within the yellow box.                c. Update the Problem List, if applicable.      If you have any questions about this query contact me at: [email or web address]     Dr. Fitzgerald,    Discharge summary includes:  \"DOUGLAS, HTN   - baseline unclear\"  Patient's creatinine levels during this encounter include:   - 2.02   - 1.72   - 1.48    After study, was acute kidney injury (DOUGLAS) clinically supported during this admission?    - acute kidney injury (DOUGLAS) was supported with additional clinical indicators:____________  - acute kidney injury (DOUGLAS) was not supported  - other- specify_____________  - unable to determine     DOUGLAS is defined by KDIGO as any of the following:  Increase in creatinine > 0.3 mg/dl within 48 hours or less; or   Increase in creatinine level to > 1.5x baseline (historical or measure), which is known or presumed to have occurred within the prior 7 days   Urine volume <0.5 ml/kg/h for 6 hours.  Reference article: http://www.kdigo.org/clinical_practice_guidelines/pdf/KDIGO%20AKI%20Guideline.pdf (as published in [employer] Supplements, The Official Journal of the International Society of Nephrology, vol. 2, issue 1, 2012.)    By submitting this query, we are merely seeking further clarification of documentation to accurately reflect all conditions that you are monitoring, evaluating, treating or that extend the hospitalization or utilize additional resources of care. Please utilize your independent clinical judgment when addressing the question(s) above.     This query and your response, once completed, will be entered into the legal medical record.    Sincerely,  Marilyn" JAGDISH Cespedes RN CCDS CCS  Clinical Documentation Integrity Program

## 2024-08-31 LAB
QT INTERVAL: 368 MS
QTC INTERVAL: 450 MS

## 2024-10-24 ENCOUNTER — HOSPITAL ENCOUNTER (OUTPATIENT)
Dept: HOSPITAL 47 - RADUSWWP | Age: 64
Discharge: HOME | End: 2024-10-24
Attending: INTERNAL MEDICINE
Payer: MEDICARE

## 2024-10-24 LAB
ALBUMIN SERPL-MCNC: 4.5 G/DL (ref 3.8–4.9)
ALBUMIN/GLOB SERPL: 1.55 RATIO (ref 1.6–3.17)
ALP SERPL-CCNC: 84 U/L (ref 41–126)
ALT SERPL-CCNC: 18 U/L (ref 10–49)
ANION GAP SERPL CALC-SCNC: 12.3 MMOL/L (ref 4–12)
AST SERPL-CCNC: 16 U/L (ref 14–35)
BUN SERPL-SCNC: 13 MG/DL (ref 9–27)
BUN/CREAT SERPL: 13 RATIO (ref 12–20)
CALCIUM SPEC-MCNC: 9.8 MG/DL (ref 8.7–10.3)
CHLORIDE SERPL-SCNC: 102 MMOL/L (ref 96–109)
CO2 SERPL-SCNC: 29.7 MMOL/L (ref 21.6–31.8)
GLOBULIN SER CALC-MCNC: 2.9 G/DL (ref 1.6–3.3)
GLUCOSE SERPL-MCNC: 82 MG/DL (ref 70–110)
POTASSIUM SERPL-SCNC: 4.1 MMOL/L (ref 3.5–5.5)
PROT SERPL-MCNC: 7.4 G/DL (ref 6.2–8.2)
SODIUM SERPL-SCNC: 144 MMOL/L (ref 135–145)

## 2024-10-24 PROCEDURE — 82105 ALPHA-FETOPROTEIN SERUM: CPT

## 2024-10-24 PROCEDURE — 76705 ECHO EXAM OF ABDOMEN: CPT

## 2024-10-24 PROCEDURE — 80053 COMPREHEN METABOLIC PANEL: CPT

## 2024-10-24 NOTE — US
EXAMINATION TYPE: US liver

 

DATE OF EXAM: 10/24/2024

 

COMPARISON: US 2024

 

CLINICAL INDICATION: Male, 63 years old with history of K74.60 UNSPECIFIED CIRRHOSIS OF LIVER;

 

TECHNIQUE: Grayscale and color Doppler imaging of the right upper quadrant was performed.

 

FINDINGS:

 

EXAM MEASUREMENTS:

 

Liver Length:  15.1 cm   

Gallbladder Wall:  0.3 cm   

CBD:  0.4 cm

Right Kidney:  10.2 x 5.5 x 5.6 cm

 

 

Pancreas:  Obscured by bowel gas

Liver:  scanned intercostally, appears wnl as seen  

Gallbladder:  ? stones vs. sludge seen when patient rolled LLD

**Evidence for sonographic Palomino's sign:  no

CBD:  visualized portions wnl, limited by overlying bowel gas 

Right Kidney:  0.8cm stone medial inferior pole 

 

 

 

IMPRESSION:

1. Findings are suggestive of gallbladder sludge with possible tiny gallstones. No  ultrasound eviden
ce of cholelithiasis.

2. Nonobstructing 8 mm right renal calculus.

 

[facility], Workstation: [id number], 10/24/2024 12:10 PM

## 2024-12-09 ENCOUNTER — APPOINTMENT (OUTPATIENT)
Dept: CT IMAGING | Facility: HOSPITAL | Age: 64
End: 2024-12-09
Payer: COMMERCIAL

## 2024-12-09 ENCOUNTER — HOSPITAL ENCOUNTER (EMERGENCY)
Facility: HOSPITAL | Age: 64
Discharge: HOME | End: 2024-12-10
Attending: EMERGENCY MEDICINE | Admitting: EMERGENCY MEDICINE
Payer: COMMERCIAL

## 2024-12-09 VITALS
RESPIRATION RATE: 18 BRPM | BODY MASS INDEX: 35.08 KG/M2 | TEMPERATURE: 97.6 F | HEART RATE: 76 BPM | OXYGEN SATURATION: 94 % | DIASTOLIC BLOOD PRESSURE: 80 MMHG | WEIGHT: 259 LBS | SYSTOLIC BLOOD PRESSURE: 124 MMHG | HEIGHT: 72 IN

## 2024-12-09 DIAGNOSIS — Z86.79 HISTORY OF CHRONIC HYPERTENSION: ICD-10-CM

## 2024-12-09 DIAGNOSIS — R10.13 EPIGASTRIC PAIN: ICD-10-CM

## 2024-12-09 DIAGNOSIS — K86.1 CHRONIC PANCREATITIS, UNSPECIFIED PANCREATITIS TYPE: Primary | ICD-10-CM

## 2024-12-09 DIAGNOSIS — Z86.39 HISTORY OF DIABETES MELLITUS: ICD-10-CM

## 2024-12-09 DIAGNOSIS — K29.00 ACUTE SUPERFICIAL GASTRITIS WITHOUT HEMORRHAGE: ICD-10-CM

## 2024-12-09 LAB
ALBUMIN SERPL-MCNC: 4.1 G/DL (ref 3.5–5.2)
ALBUMIN/GLOB SERPL: 1.2 G/DL
ALP SERPL-CCNC: 90 U/L (ref 39–117)
ALT SERPL W P-5'-P-CCNC: 44 U/L (ref 1–41)
ANION GAP SERPL CALCULATED.3IONS-SCNC: 11 MMOL/L (ref 5–15)
AST SERPL-CCNC: 36 U/L (ref 1–40)
BASOPHILS # BLD AUTO: 0.07 10*3/MM3 (ref 0–0.2)
BASOPHILS NFR BLD AUTO: 0.5 % (ref 0–1.5)
BILIRUB SERPL-MCNC: 0.5 MG/DL (ref 0–1.2)
BUN SERPL-MCNC: 21 MG/DL (ref 8–23)
BUN/CREAT SERPL: 13.9 (ref 7–25)
CALCIUM SPEC-SCNC: 8.9 MG/DL (ref 8.6–10.5)
CHLORIDE SERPL-SCNC: 103 MMOL/L (ref 98–107)
CO2 SERPL-SCNC: 23 MMOL/L (ref 22–29)
CREAT SERPL-MCNC: 1.51 MG/DL (ref 0.76–1.27)
D-LACTATE SERPL-SCNC: 1 MMOL/L (ref 0.5–2)
DEPRECATED RDW RBC AUTO: 45.2 FL (ref 37–54)
EGFRCR SERPLBLD CKD-EPI 2021: 51.6 ML/MIN/1.73
EOSINOPHIL # BLD AUTO: 0.17 10*3/MM3 (ref 0–0.4)
EOSINOPHIL NFR BLD AUTO: 1.3 % (ref 0.3–6.2)
ERYTHROCYTE [DISTWIDTH] IN BLOOD BY AUTOMATED COUNT: 14.2 % (ref 12.3–15.4)
GLOBULIN UR ELPH-MCNC: 3.4 GM/DL
GLUCOSE SERPL-MCNC: 179 MG/DL (ref 65–99)
HCT VFR BLD AUTO: 46 % (ref 37.5–51)
HGB BLD-MCNC: 15.2 G/DL (ref 13–17.7)
HOLD SPECIMEN: NORMAL
IMM GRANULOCYTES # BLD AUTO: 0.08 10*3/MM3 (ref 0–0.05)
IMM GRANULOCYTES NFR BLD AUTO: 0.6 % (ref 0–0.5)
LIPASE SERPL-CCNC: 55 U/L (ref 13–60)
LYMPHOCYTES # BLD AUTO: 1.81 10*3/MM3 (ref 0.7–3.1)
LYMPHOCYTES NFR BLD AUTO: 13.7 % (ref 19.6–45.3)
MCH RBC QN AUTO: 28.7 PG (ref 26.6–33)
MCHC RBC AUTO-ENTMCNC: 33 G/DL (ref 31.5–35.7)
MCV RBC AUTO: 86.8 FL (ref 79–97)
MONOCYTES # BLD AUTO: 0.89 10*3/MM3 (ref 0.1–0.9)
MONOCYTES NFR BLD AUTO: 6.7 % (ref 5–12)
NEUTROPHILS NFR BLD AUTO: 10.21 10*3/MM3 (ref 1.7–7)
NEUTROPHILS NFR BLD AUTO: 77.2 % (ref 42.7–76)
NRBC BLD AUTO-RTO: 0 /100 WBC (ref 0–0.2)
PLATELET # BLD AUTO: 170 10*3/MM3 (ref 140–450)
PMV BLD AUTO: 10.1 FL (ref 6–12)
POTASSIUM SERPL-SCNC: 4.2 MMOL/L (ref 3.5–5.2)
PROT SERPL-MCNC: 7.5 G/DL (ref 6–8.5)
RBC # BLD AUTO: 5.3 10*6/MM3 (ref 4.14–5.8)
SODIUM SERPL-SCNC: 137 MMOL/L (ref 136–145)
TROPONIN T SERPL HS-MCNC: 18 NG/L
WBC NRBC COR # BLD AUTO: 13.23 10*3/MM3 (ref 3.4–10.8)
WHOLE BLOOD HOLD COAG: NORMAL
WHOLE BLOOD HOLD SPECIMEN: NORMAL

## 2024-12-09 PROCEDURE — 25010000002 MORPHINE PER 10 MG: Performed by: EMERGENCY MEDICINE

## 2024-12-09 PROCEDURE — 96375 TX/PRO/DX INJ NEW DRUG ADDON: CPT

## 2024-12-09 PROCEDURE — 83690 ASSAY OF LIPASE: CPT | Performed by: EMERGENCY MEDICINE

## 2024-12-09 PROCEDURE — 84484 ASSAY OF TROPONIN QUANT: CPT | Performed by: EMERGENCY MEDICINE

## 2024-12-09 PROCEDURE — 74176 CT ABD & PELVIS W/O CONTRAST: CPT

## 2024-12-09 PROCEDURE — 99284 EMERGENCY DEPT VISIT MOD MDM: CPT

## 2024-12-09 PROCEDURE — 83605 ASSAY OF LACTIC ACID: CPT | Performed by: EMERGENCY MEDICINE

## 2024-12-09 PROCEDURE — 96374 THER/PROPH/DIAG INJ IV PUSH: CPT

## 2024-12-09 PROCEDURE — 93005 ELECTROCARDIOGRAM TRACING: CPT | Performed by: EMERGENCY MEDICINE

## 2024-12-09 PROCEDURE — 85025 COMPLETE CBC W/AUTO DIFF WBC: CPT | Performed by: EMERGENCY MEDICINE

## 2024-12-09 PROCEDURE — 80053 COMPREHEN METABOLIC PANEL: CPT | Performed by: EMERGENCY MEDICINE

## 2024-12-09 PROCEDURE — 25010000002 ONDANSETRON PER 1 MG: Performed by: EMERGENCY MEDICINE

## 2024-12-09 RX ORDER — SODIUM CHLORIDE 0.9 % (FLUSH) 0.9 %
10 SYRINGE (ML) INJECTION AS NEEDED
Status: DISCONTINUED | OUTPATIENT
Start: 2024-12-09 | End: 2024-12-10 | Stop reason: HOSPADM

## 2024-12-09 RX ORDER — ONDANSETRON 2 MG/ML
4 INJECTION INTRAMUSCULAR; INTRAVENOUS ONCE
Status: COMPLETED | OUTPATIENT
Start: 2024-12-09 | End: 2024-12-09

## 2024-12-09 RX ORDER — MORPHINE SULFATE 4 MG/ML
4 INJECTION, SOLUTION INTRAMUSCULAR; INTRAVENOUS ONCE
Status: COMPLETED | OUTPATIENT
Start: 2024-12-09 | End: 2024-12-09

## 2024-12-09 RX ADMIN — ONDANSETRON 4 MG: 2 INJECTION INTRAMUSCULAR; INTRAVENOUS at 22:28

## 2024-12-09 RX ADMIN — MORPHINE SULFATE 4 MG: 4 INJECTION, SOLUTION INTRAMUSCULAR; INTRAVENOUS at 22:27

## 2024-12-09 NOTE — Clinical Note
[facility] EMERGENCY DEPARTMENT  [address]  [facility] [address]  Phone: [phone]    Dionte Dent was seen and treated in our emergency department on 12/9/2024.  He may return to work on 12/12/2024.         Thank you for choosing [facility].    Brian Blevins MD

## 2024-12-10 PROCEDURE — 96376 TX/PRO/DX INJ SAME DRUG ADON: CPT

## 2024-12-10 PROCEDURE — 25010000002 MORPHINE PER 10 MG: Performed by: EMERGENCY MEDICINE

## 2024-12-10 PROCEDURE — 25810000003 SODIUM CHLORIDE 0.9 % SOLUTION: Performed by: EMERGENCY MEDICINE

## 2024-12-10 RX ORDER — MORPHINE SULFATE 4 MG/ML
4 INJECTION, SOLUTION INTRAMUSCULAR; INTRAVENOUS ONCE
Status: COMPLETED | OUTPATIENT
Start: 2024-12-10 | End: 2024-12-10

## 2024-12-10 RX ORDER — HYDROCODONE BITARTRATE AND ACETAMINOPHEN 5; 325 MG/1; MG/1
1 TABLET ORAL EVERY 8 HOURS PRN
Qty: 9 TABLET | Refills: 0 | Status: SHIPPED | OUTPATIENT
Start: 2024-12-10 | End: 2024-12-13

## 2024-12-10 RX ORDER — ONDANSETRON 4 MG/1
4 TABLET, ORALLY DISINTEGRATING ORAL EVERY 8 HOURS PRN
Qty: 15 TABLET | Refills: 0 | Status: SHIPPED | OUTPATIENT
Start: 2024-12-10 | End: 2024-12-15

## 2024-12-10 RX ADMIN — SODIUM CHLORIDE 1000 ML: 9 INJECTION, SOLUTION INTRAVENOUS at 00:16

## 2024-12-10 RX ADMIN — MORPHINE SULFATE 4 MG: 4 INJECTION, SOLUTION INTRAMUSCULAR; INTRAVENOUS at 00:16

## 2024-12-10 NOTE — ED PROVIDER NOTES
Subjective   History of Present Illness  Is a 64-year-old male with a history of hypertension dyslipidemia presenting to the emergency department with some upper mid abdominal pain.  Patient is had this for the last 48 hours.  Complaining of some dull leg pain in his mid abdomen.  Is been very nauseous.  No vomiting.  Patient is longstanding history of pancreatitis.  States that he had a cyst there and they think that is what is causing it.  Denies any alcohol use.  Patient is not having any other diarrhea.  No hematuria dysuria.  No fevers or chills.  No headache or change in vision.  No focal weakness.  No chest pain or shortness of breath    History provided by:  Patient   used: No        Review of Systems   Constitutional:  Negative for chills and fever.   HENT:  Negative for congestion, ear pain and sore throat.    Eyes:  Negative for visual disturbance.   Respiratory:  Negative for shortness of breath.    Cardiovascular:  Negative for chest pain.   Gastrointestinal:  Positive for abdominal pain and nausea.   Genitourinary:  Negative for difficulty urinating.   Musculoskeletal:  Negative for arthralgias.   Skin:  Negative for rash.   Neurological:  Negative for dizziness, weakness and numbness.   Psychiatric/Behavioral:  Negative for agitation.        Past Medical History:   Diagnosis Date    Arthritis     Diabetes mellitus     Elevated cholesterol     Hypertension        Allergies   Allergen Reactions    Statins Nausea And Vomiting    Tagamet [Cimetidine] Unknown - High Severity       History reviewed. No pertinent surgical history.    Family History   Problem Relation Age of Onset    Hypertension Father        Social History     Socioeconomic History    Marital status:    Tobacco Use    Smoking status: Every Day     Current packs/day: 1.00     Types: Cigarettes     Passive exposure: Current    Smokeless tobacco: Never   Vaping Use    Vaping status: Never Used   Substance and Sexual  Activity    Alcohol use: Never    Drug use: Never    Sexual activity: Defer           Objective   Physical Exam  Vitals and nursing note reviewed.   Constitutional:       General: He is not in acute distress.     Appearance: He is not ill-appearing or toxic-appearing.   HENT:      Mouth/Throat:      Pharynx: No posterior oropharyngeal erythema.   Eyes:      Extraocular Movements: Extraocular movements intact.      Pupils: Pupils are equal, round, and reactive to light.   Cardiovascular:      Rate and Rhythm: Normal rate and regular rhythm.   Pulmonary:      Effort: Pulmonary effort is normal. No respiratory distress.   Abdominal:      General: Abdomen is flat. There is no distension.      Palpations: There is no mass.      Tenderness: There is abdominal tenderness in the epigastric area. There is no guarding or rebound.   Musculoskeletal:         General: No deformity. Normal range of motion.   Neurological:      General: No focal deficit present.      Mental Status: He is alert.      Sensory: No sensory deficit.      Motor: No weakness.         Procedures           ED Course  ED Course as of 12/10/24 0049   Mon Dec 09, 2024   2149 BP: 125/85 [JK]   2149 Temp: 97.6 °F (36.4 °C) [JK]   2149 Temp src: Oral [JK]   2149 Heart Rate: 81 [JK]   2149 Resp: 18 [JK]   2149 SpO2: 97 % [JK]   2149 Device (Oxygen Therapy): room air  Interpretation:  Patient's repeat vitals, telemetry tracing, and pulse oximetry tracing were directly viewed and interpreted by myself.   O2 sat 97% on room air, interpreted as normal  Telemetry rhythm strip revealed a rate of 81 bpm, interpreted as normal sinus rhythm [JK]   2149 SMOKING CESSATION COUNSELING:  I independently performed smoking cessation counseling with the patient for a total of 5 minutes.  I discussed the risks associated with smoking, including increased risk of chronic lung disease, cardiovascular disease and cancer.  I offered to provide resources related to assistance with  smoking cessation and also offered to prescribe nicotine replacement therapy, including nicotine gum and patches.  The patient is not currently interested in quitting at this time.  I am providing discharge instructions related to smoking cessation as well as information on how to obtain treatment should they decide.   [JK]   Tue Dec 10, 2024   0045 Comprehensive Metabolic Panel(!) [JK]   0045 Lipase [JK]   0045 CBC & Differential(!) [JK]   0045 Lactic Acid, Plasma [JK]   0045 Single High Sensitivity Troponin T  Interpretation:  Laboratory studies were reviewed and interpreted directly by myself.  CMP shows mild elevation creatinine 1.51, CBC showed some mild leukocytosis with a white blood cell count of 13.2, lipase normal, troponin normal, lactic normal [JK]   0045 CT Abdomen Pelvis Without Contrast  Interpretation:  Imaging was directly visualized by myself, per my interpretations, CT abdomen pelvis showed some chronic pancreatitis.  No acute abscess. [JK]   0045 On reevaluation, the patient is feeling much better.  Repeat abdominal examination is benign.  Patient is tolerating oral liquid intake.  Findings are consistent with acute on chronic pancreatitis.  However his lipase appears stable.  His CT scan is no change significantly from previous.  We did have a long discussion about admission for further pain management and treatment versus outpatient management.  We went over the both the benefits and risks.  The patient is elected for outpatient treatment at this time.  I instructed him to adhere to a clear liquid diet for the next 48 hours.  Be discharged with pain control.  He has follow-up with his PCP return to the emergency department at that time for repeat abdominal examination.  Patient given strict return precautions.  Verbalized understanding. [JK]   0046 I had a discussion with the patient/family regarding diagnosis, diagnostic results, treatment plan, and medications. The patient/family indicated  understanding of these instructions. I spent adequate time at the bedside prior to discharge necessary to discuss the aftercare instructions, giving patient education, providing explanations of the results of our evaluations/findings, and my decision making to assure that the patient/family understand the plan of care. Time was allotted to answer questions at that time and throughout the ED course. Patient is required to maintain timely follow up, as discussed. I also discussed the potential for the development of an acute emergent condition requiring further evaluation, return to the ER, admission, or even surgical intervention.  I encouraged the patient to return to the emergency department immediately for any concerns, worsening symptoms, new complaints, or if symptoms persist and they are unable to seek follow-up in a timely fashion. The patient/family expressed understanding and agreement with this plan    Shared decision making:   After full review of the patient's clinical presentation, review of any work-up including but not limited to laboratory studies and radiology obtained, I had a discussion with the patient.  Treatment options were discussed as well as the risks, benefits and consequences.  I discussed all findings with the patient and family members if available.  During the discussion, treatment goals were understood by all as well as any misconceptions which were addressed with the patient.  Ample time was given for any questions they may have had.  They are in agreement with the treatment plan as well as final disposition. [JK]   0047 Due to the patient's presentation in the emergency department, I do believe they will benefit from a very short course of controlled substances.  I did review Azam on the patient.   0 morphine equivalents in the last 30 days. [JK]      ED Course User Index  [JK] Brian Blevins MD KASPER reviewed by Gen  Brian WILCOX MD       Medical Decision Making  This is a 64-year-old male with a history of hypertension and pancreatitis presenting to the emergency department with some upper abdominal discomfort.  The patient does have some focal tenderness in the area, however there is no evidence of acute abdomen or peritonitis.  Symptoms seem consistent with pancreatitis possibly dyspepsia gastritis.  Overall, the patient is nontoxic.  Afebrile.  IV access was established and the patient.  Placed on continuous telemetry monitoring.  Given the patient's presentation, differential is broad and will require further evaluation.  Workup initiated.      Differential diagnosis: Peptic ulcer disease, dyspepsia, GERD, pancreatitis, biliary colic, electrolyte abnormality, acute kidney injury      Amount and/or Complexity of Data Reviewed  External Data Reviewed: labs, radiology, ECG and notes.     Details: External laboratories, imaging as well as notes were reviewed personally by myself.  All relevant studies were used to guide decision making.     Date of previous record: 8/21/2024    Source of note: Admission record    Summary: Patient was admitted for acute pancreatitis.  I did review based laboratory studies and fossils previous CT abdomen pelvis and EKG.  Records reviewed    Labs: ordered. Decision-making details documented in ED Course.  Radiology: ordered and independent interpretation performed. Decision-making details documented in ED Course.  ECG/medicine tests: ordered and independent interpretation performed. Decision-making details documented in ED Course.    Risk  Prescription drug management.        Final diagnoses:   Chronic pancreatitis, unspecified pancreatitis type   Epigastric pain   Acute superficial gastritis without hemorrhage   History of diabetes mellitus   History of chronic hypertension       ED Disposition  ED Disposition       ED Disposition   Discharge    Condition   Stable    Comment   --               Dove,  Orin HSU, APRN  [address]  [place] [id number]  [phone]    Call in 1 day           Medication List        Changed      HYDROcodone-acetaminophen 5-325 MG per tablet  Commonly known as: NORCO  Take 1 tablet by mouth Every 8 (Eight) Hours As Needed for Moderate Pain for up to 3 days.  What changed:   when to take this  reasons to take this     ondansetron ODT 4 MG disintegrating tablet  Commonly known as: ZOFRAN-ODT  Place 1 tablet on the tongue Every 8 (Eight) Hours As Needed for Nausea or Vomiting for up to 5 days.  What changed:   when to take this  reasons to take this               Where to Get Your Medications        These medications were sent to [facility] [address] [place] [address] - [address] [place] [address] - [phone]  - [phone] [place], [place] [id number]      Phone: [phone]   HYDROcodone-acetaminophen 5-325 MG per tablet  ondansetron ODT 4 MG disintegrating tablet            Brian Blevins MD  12/10/24 0049

## 2024-12-12 LAB
QT INTERVAL: 378 MS
QTC INTERVAL: 422 MS

## 2025-05-09 ENCOUNTER — HOSPITAL ENCOUNTER (OUTPATIENT)
Dept: HOSPITAL 47 - RADUSWWP | Age: 65
Discharge: HOME | End: 2025-05-09
Attending: INTERNAL MEDICINE
Payer: MEDICARE

## 2025-05-09 DIAGNOSIS — K83.8: ICD-10-CM

## 2025-05-09 DIAGNOSIS — K76.89: ICD-10-CM

## 2025-05-09 DIAGNOSIS — N20.0: ICD-10-CM

## 2025-05-09 DIAGNOSIS — K74.60: Primary | ICD-10-CM

## 2025-05-09 DIAGNOSIS — K80.20: ICD-10-CM

## 2025-05-09 LAB
ALBUMIN SERPL-MCNC: 4.8 G/DL (ref 3.8–4.9)
ALBUMIN/GLOB SERPL: 1.37 RATIO (ref 1.6–3.17)
ALP SERPL-CCNC: 102 U/L (ref 41–126)
ALT SERPL-CCNC: 17 U/L (ref 10–49)
AST SERPL-CCNC: 20 U/L (ref 14–35)
BASOPHILS # BLD AUTO: 0.03 X 10*3/UL (ref 0–0.1)
BASOPHILS NFR BLD AUTO: 0.5 %
BUN SERPL-SCNC: 12.2 MG/DL (ref 9–27)
BUN/CREAT SERPL: 15.25 RATIO (ref 12–20)
CALCIUM SPEC-MCNC: 9.9 MG/DL (ref 8.7–10.3)
CHLORIDE SERPL-SCNC: 103 MMOL/L (ref 96–109)
CO2 SERPL-SCNC: 25.9 MMOL/L (ref 21.6–31.8)
ERYTHROCYTE [DISTWIDTH] IN BLOOD BY AUTOMATED COUNT: 4.36 X 10*6/UL (ref 4.4–5.6)
ERYTHROCYTE [DISTWIDTH] IN BLOOD: 13.7 % (ref 11.5–14.5)
GLOBULIN SER CALC-MCNC: 3.5 G/DL (ref 1.6–3.3)
GLUCOSE SERPL-MCNC: 57 MG/DL (ref 70–110)
HCT VFR BLD AUTO: 43.9 % (ref 39.6–50)
HGB BLD-MCNC: 14.4 G/DL (ref 13–17)
LYMPHOCYTES # SPEC AUTO: 2.03 X 10*3/UL (ref 0.9–5)
LYMPHOCYTES NFR SPEC AUTO: 33.9 %
MCHC RBC AUTO-ENTMCNC: 32.8 G/DL (ref 32–37)
MCV RBC AUTO: 100.7 FL (ref 80–97)
MONOCYTES NFR BLD AUTO: 6.7 %
NEUTROPHILS NFR BLD AUTO: 53.6 %
NRBC BLD AUTO-RTO: 0 X 10*3/UL (ref 0–0.01)
PLATELET # BLD AUTO: 138 X 10*3/UL (ref 140–440)
PROT SERPL-MCNC: 8.3 G/DL (ref 6.2–8.2)
SODIUM SERPL-SCNC: 144 MMOL/L (ref 135–145)
WBC # BLD AUTO: 5.98 X 10*3/UL (ref 4.5–10)

## 2025-05-09 PROCEDURE — 80053 COMPREHEN METABOLIC PANEL: CPT

## 2025-05-09 PROCEDURE — 82105 ALPHA-FETOPROTEIN SERUM: CPT

## 2025-05-09 PROCEDURE — 76705 ECHO EXAM OF ABDOMEN: CPT

## 2025-05-09 PROCEDURE — 85025 COMPLETE CBC W/AUTO DIFF WBC: CPT
